# Patient Record
Sex: MALE | Race: WHITE | NOT HISPANIC OR LATINO | Employment: OTHER | URBAN - METROPOLITAN AREA
[De-identification: names, ages, dates, MRNs, and addresses within clinical notes are randomized per-mention and may not be internally consistent; named-entity substitution may affect disease eponyms.]

---

## 2023-03-15 ENCOUNTER — NURSING HOME VISIT (OUTPATIENT)
Dept: POST ACUTE CARE | Facility: EXTERNAL LOCATION | Age: 78
End: 2023-03-15
Payer: MEDICARE

## 2023-03-15 DIAGNOSIS — R41.82 ALTERED MENTAL STATUS, UNSPECIFIED ALTERED MENTAL STATUS TYPE: ICD-10-CM

## 2023-03-15 PROCEDURE — 99308 SBSQ NF CARE LOW MDM 20: CPT | Performed by: INTERNAL MEDICINE

## 2023-03-15 NOTE — LETTER
Patient: Rylan Garcia  : 1945    Encounter Date: 03/15/2023    Subjective  Chief complaint: Rylan Garcia is a 77 y.o. male who is a long term resident patient being seen and evaluated for AMS    HPI:  Nurse reports patient with altered mental status.  Does not seem to be in any distress however he is more lethargic and sleepy than usual.  No other concerns. VS WNL        Review of Systems  All systems reviewed and negative except for what was mentioned in the HPI    Vital signs: 124/72    Objective  Physical Exam  Constitutional:       General: He is not in acute distress.  Eyes:      Extraocular Movements: Extraocular movements intact.   Cardiovascular:      Rate and Rhythm: Regular rhythm.   Pulmonary:      Effort: Pulmonary effort is normal.      Breath sounds: Normal breath sounds.   Abdominal:      General: Bowel sounds are normal.      Palpations: Abdomen is soft.   Musculoskeletal:      Cervical back: Neck supple.      Right lower leg: No edema.      Left lower leg: No edema.   Neurological:      Mental Status: He is alert.   Psychiatric:         Mood and Affect: Mood normal.         Behavior: Behavior is cooperative.         Assessment/Plan  Problem List Items Addressed This Visit          Other    AMS (altered mental status)     Obtain UA C&S          Medications, treatments, and labs reviewed  Continue medications and treatments as listed in [facility]    Scribe Attestation  By signing my name below, IMagali Scribe   attest that this documentation has been prepared under the direction and in the presence of Cheko Maynard MD.    Provider Attestation - Scribe documentation  All medical record entries made by the Scribe were at my direction and personally dictated by me. I have reviewed the chart and agree that the record accurately reflects my personal performance of the history, physical exam, discussion and plan.      Electronically Signed By: Cheko Maynard MD   3/21/23  2:44 PM

## 2023-03-20 PROBLEM — R41.82 AMS (ALTERED MENTAL STATUS): Status: ACTIVE | Noted: 2023-03-20

## 2023-03-23 ENCOUNTER — NURSING HOME VISIT (OUTPATIENT)
Dept: POST ACUTE CARE | Facility: EXTERNAL LOCATION | Age: 78
End: 2023-03-23
Payer: MEDICARE

## 2023-03-23 DIAGNOSIS — I10 BENIGN HYPERTENSION: ICD-10-CM

## 2023-03-23 DIAGNOSIS — S06.5XAA SDH (SUBDURAL HEMATOMA) (MULTI): ICD-10-CM

## 2023-03-23 DIAGNOSIS — F03.90 DEMENTIA, UNSPECIFIED DEMENTIA SEVERITY, UNSPECIFIED DEMENTIA TYPE, UNSPECIFIED WHETHER BEHAVIORAL, PSYCHOTIC, OR MOOD DISTURBANCE OR ANXIETY (MULTI): ICD-10-CM

## 2023-03-23 PROCEDURE — 99305 1ST NF CARE MODERATE MDM 35: CPT | Performed by: FAMILY MEDICINE

## 2023-03-23 NOTE — LETTER
Patient: Rylan Garcia  : 1945    Encounter Date: 2023    Rylan Garcia is a 78 y.o. male with Chief Complaint of No chief complaint on file..    Resident seen 3/23/23 -- BRENDA    : 1945  SNF H&P done 3/23/23  Allergy: Aricept  DNR-CCA    S: 77 yo man with dementia, and HTN admitted to SNF rehab after hospitalization for fall with CHI, SHD s/p drain and new pAFIB in RVR.  No CP/SOB. Med List & Problem list reviewed.     O: VSS AFEB Awake, pleasantly confused, NAD.  Chest cta. Heart rrr. Ext no c/c/e.   Left hemiplegia.     A/P:  # Gait Instability/Falls/CHI/Left hemiplegia d/t SDH: fall precautions.  PT/OT.   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   # BPH w LUTS: alfuzosin 10, finasteride  # MDD: sertraline  # Hypothyroidism: 25 mcg LT4  # HTN: stable on alpha blocker.   # pAFIB: monitor HR.  No OAC or BB d/t frequent falls.       Past Surgical History:   Procedure Laterality Date   • CATARACT EXTRACTION  2018    Cataract Extraction   • HERNIA REPAIR  2018    Hernia Repair      Social History     Socioeconomic History   • Marital status:      Spouse name: Not on file   • Number of children: Not on file   • Years of education: Not on file   • Highest education level: Not on file   Occupational History   • Not on file   Tobacco Use   • Smoking status: Never   • Smokeless tobacco: Never   Vaping Use   • Vaping status: Not on file   Substance and Sexual Activity   • Alcohol use: Not on file   • Drug use: Not on file   • Sexual activity: Not on file   Other Topics Concern   • Not on file   Social History Narrative   • Not on file     Social Determinants of Health     Financial Resource Strain: Not on file   Food Insecurity: Not on file   Transportation Needs: Not on file   Physical Activity: Not on file   Stress: Not on file   Social Connections: Not on file   Intimate Partner Violence: Not on file   Housing Stability: Not on file     Past  Medical History:   Diagnosis Date   • Other specified abnormal findings of blood chemistry     Blood cholesterol increased compared with prior measurement   • Other specified postprocedural states     Status post hernia repair   • Unspecified cataract     Cataracts, bilateral      Family History   Problem Relation Name Age of Onset   • No Known Problems Mother     • No Known Problems Father          Review of Systems   Constitutional:  Negative for chills, fatigue and fever.   HENT:  Negative for rhinorrhea and sore throat.    Eyes:  Negative for pain and redness.   Respiratory:  Negative for cough and shortness of breath.    Cardiovascular:  Negative for chest pain and palpitations.   Gastrointestinal:  Negative for abdominal pain and blood in stool.   Endocrine: Negative for polydipsia and polyuria.   Genitourinary:  Negative for dysuria and hematuria.   Musculoskeletal:  Negative for back pain and neck stiffness.   Skin:  Negative for rash and wound.   Allergic/Immunologic: Negative for environmental allergies and food allergies.   Neurological:  Negative for weakness and headaches.   Hematological:  Negative for adenopathy. Does not bruise/bleed easily.   Psychiatric/Behavioral:  Negative for hallucinations and suicidal ideas.       There were no vitals taken for this visit.  Physical Exam  Vitals reviewed.   Constitutional:       General: He is not in acute distress.     Appearance: He is not ill-appearing.   HENT:      Head: Normocephalic and atraumatic.      Right Ear: Tympanic membrane normal.      Left Ear: Tympanic membrane normal.      Nose: No congestion or rhinorrhea.      Mouth/Throat:      Pharynx: No oropharyngeal exudate or posterior oropharyngeal erythema.   Eyes:      Extraocular Movements: Extraocular movements intact.      Conjunctiva/sclera: Conjunctivae normal.      Pupils: Pupils are equal, round, and reactive to light.   Cardiovascular:      Rate and Rhythm: Normal rate and regular rhythm.       Heart sounds: No murmur heard.     No friction rub. No gallop.   Pulmonary:      Effort: Pulmonary effort is normal.      Breath sounds: Normal breath sounds. No wheezing, rhonchi or rales.   Abdominal:      General: There is no distension.      Palpations: Abdomen is soft.      Tenderness: There is no abdominal tenderness. There is no guarding or rebound.   Musculoskeletal:         General: No swelling or deformity.      Cervical back: Normal range of motion and neck supple.      Right lower leg: No edema.      Left lower leg: No edema.   Skin:     Capillary Refill: Capillary refill takes less than 2 seconds.      Coloration: Skin is not jaundiced.      Findings: No rash.   Neurological:      Mental Status: He is alert.      Motor: Weakness present.      Gait: Gait abnormal.   Psychiatric:         Mood and Affect: Mood normal.       Lab Results   Component Value Date    WBC 12.0 (H) 03/18/2023    HGB 13.0 (L) 03/18/2023    HCT 38.0 (L) 03/18/2023    MCV 95 03/18/2023     03/18/2023     Lab Results   Component Value Date    CHOL 221 (H) 01/10/2019     Lab Results   Component Value Date    HDL 44.7 01/10/2019     No results found for: LDLCALC  Lab Results   Component Value Date    TRIG 91 01/10/2019     No components found for: CHOLHDL  No results found for: HGBA1C    Assessment/Plan  Problem List Items Addressed This Visit    None  Visit Diagnoses       SDH (subdural hematoma)        Benign hypertension        Dementia, unspecified dementia severity, unspecified dementia type, unspecified whether behavioral, psychotic, or mood disturbance or anxiety (CMS/[facility])                  Electronically Signed By: David Hong MD   3/25/23  2:17 PM

## 2023-03-24 ENCOUNTER — NURSING HOME VISIT (OUTPATIENT)
Dept: POST ACUTE CARE | Facility: EXTERNAL LOCATION | Age: 78
End: 2023-03-24
Payer: MEDICARE

## 2023-03-24 DIAGNOSIS — S06.5XAA SDH (SUBDURAL HEMATOMA) (MULTI): Primary | ICD-10-CM

## 2023-03-24 DIAGNOSIS — E78.5 HYPERLIPIDEMIA, UNSPECIFIED HYPERLIPIDEMIA TYPE: ICD-10-CM

## 2023-03-24 DIAGNOSIS — I48.91 ATRIAL FIBRILLATION, UNSPECIFIED TYPE (MULTI): ICD-10-CM

## 2023-03-24 DIAGNOSIS — I10 BENIGN HYPERTENSION: ICD-10-CM

## 2023-03-24 DIAGNOSIS — N40.0 BENIGN PROSTATIC HYPERPLASIA, UNSPECIFIED WHETHER LOWER URINARY TRACT SYMPTOMS PRESENT: ICD-10-CM

## 2023-03-24 DIAGNOSIS — F32.A DEPRESSION, UNSPECIFIED DEPRESSION TYPE: ICD-10-CM

## 2023-03-24 DIAGNOSIS — K59.00 CONSTIPATION, UNSPECIFIED CONSTIPATION TYPE: ICD-10-CM

## 2023-03-24 DIAGNOSIS — E56.9 VITAMIN DEFICIENCY: ICD-10-CM

## 2023-03-24 DIAGNOSIS — E03.9 HYPOTHYROIDISM, UNSPECIFIED TYPE: ICD-10-CM

## 2023-03-24 DIAGNOSIS — M15.9 GENERALIZED OSTEOARTHRITIS OF MULTIPLE SITES: ICD-10-CM

## 2023-03-24 PROCEDURE — 99310 SBSQ NF CARE HIGH MDM 45: CPT | Performed by: NURSE PRACTITIONER

## 2023-03-24 NOTE — LETTER
Patient: Rylan Garcia  : 1945    Encounter Date: 2023    *Provider Impression*    Patient is a 77 year old male who is seen today for management of multiple medical problems  #SDH / OA - s/p R craniotomy; PT/OT/ST; helmet, acetaminophen 650mg q6h PRN, gluosamine 1000mg QOD, oxycodone 5mg q6h PRN, f/u w/ Dr. Ramos,   #HTN / HLD / A-fib - CoQ10 100mg daily, Zio patch, f/u w/ EP on , omega 3 650mg QOD  #BPH - alfluzosin ER 10mg daily, finasteride 5mg daily  #Hypothyroidism - levothyroxine 25mcg daily  #Constipation - colace 100mg BID, senna 17.2mg daily  #Depression - sertraline 50mg daily  #Vitamin deficiency - vitamin B12 250mcg daily, multivitamin daily  Follow up as needed      *Chief Complaint*     SDH    *History of Present Illness*    Patient is a 76 y/o male w/ PMH as below who presented as a transfer 3/16/23 with subacute development of left sided weakness, confusion, and incontinence.  CTH showed a new subdural hematomas (R>L).  Patient was taken to the OR 3/17/23 for a R crani for SDH evacuation. CTH improved MLS.  Surgical drain removed POD3.  Electrophysiology consulted for new onset A-fib and Ziopatch placed at discharge w/ plan to follow up with EP 23.  PT/OT evaluated and recommended SNF.  Speech evaluated and patient was cleared for a dysphagia advanced diet with nectar thickened liquids.  Patient was discharged to [facility] in satisfactory condition   with scheduled follow up.      He is seen sitting up by the nurse's station and denies any pain, no HA, dizziness, vision changes, weakness, numbness, tingling, paresthesias, CP, SOB, cough, n/v, constipation, diarrhea, or any other c/o presently.     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, dementia, hypothyroidism, frequent falls, SDH  PSH - hernia repair, cataract extraction  FH - Father had heart disease, dementia, mother had HTN  SocHx -  former smoker, No  EtOH    *Review of Systems*  All other systems reviewed are negative except as noted in the HPI     *Vital Signs*   Date: 3/24/23  - T: 98.1  P: 70  R: 16  BP: 130/76  SpO2: 94% on RA    *Results / Data*  CBC - Date: 3/24/23  WBC: 8.69  HGB: 14.7  HCT: 41.7  PLT: 361  ;   BMP - Date: 3/24/23  Na: 136  K: 3.9  Cl: 101  CO2: 23  BUN: 17  Cr: 0.95  Glu: 137  Ca: 9.4  ;   LFT - Date: 3/24/23  AST: 58  ALT: 81  ALP: 124  TBili: 0.3  ;   Coags - Date:   INR:   PT:    *Physical Exam*  Gen: (+) NAD, (+) well-appearing  HEENT: (+) incision w/ staples c/d/i, (+) MMM  Neck: (+) supple  Lungs: (+) CTAB, (-) wheezes, (-) rales, (-) rhonchi  Heart: (+) RRR, (+) S1 S2, (-) murmurs  Pulses: (+) palpable  Abd: (+) soft, (+) NT, (+) ND, (+) BS+  Ext: (-) edema, (-) deformity  MSK: (-) joint swelling  Skin: (+) warm, (+) dry, (-) rash  Neuro: (+) follows commands, (-) tremor, (+) alert      Electronically Signed By: GIN Mcnulty-CNP   3/28/23 12:22 AM

## 2023-03-25 ENCOUNTER — NURSING HOME VISIT (OUTPATIENT)
Dept: POST ACUTE CARE | Facility: EXTERNAL LOCATION | Age: 78
End: 2023-03-25
Payer: MEDICARE

## 2023-03-25 DIAGNOSIS — I10 PRIMARY HYPERTENSION: ICD-10-CM

## 2023-03-25 DIAGNOSIS — F02.C3 OTHER SEVERE FRONTOTEMPORAL DEMENTIA WITH MOOD DISTURBANCE (MULTI): ICD-10-CM

## 2023-03-25 DIAGNOSIS — G31.09 OTHER SEVERE FRONTOTEMPORAL DEMENTIA WITH MOOD DISTURBANCE (MULTI): ICD-10-CM

## 2023-03-25 DIAGNOSIS — S06.5XAA SDH (SUBDURAL HEMATOMA) (MULTI): Primary | ICD-10-CM

## 2023-03-25 PROBLEM — G83.10: Status: ACTIVE | Noted: 2023-03-25

## 2023-03-25 PROBLEM — F03.90 DEMENTIA (MULTI): Status: ACTIVE | Noted: 2023-03-25

## 2023-03-25 PROCEDURE — 99308 SBSQ NF CARE LOW MDM 20: CPT | Performed by: FAMILY MEDICINE

## 2023-03-25 ASSESSMENT — ENCOUNTER SYMPTOMS
HEADACHES: 0
NECK STIFFNESS: 0
SORE THROAT: 0
WOUND: 0
PALPITATIONS: 0
EYE REDNESS: 0
HALLUCINATIONS: 0
SHORTNESS OF BREATH: 0
COUGH: 0
FEVER: 0
ABDOMINAL PAIN: 0
HEMATURIA: 0
DYSURIA: 0
RHINORRHEA: 0
FATIGUE: 0
BRUISES/BLEEDS EASILY: 0
BLOOD IN STOOL: 0
BACK PAIN: 0
EYE PAIN: 0
WEAKNESS: 0
ADENOPATHY: 0
POLYDIPSIA: 0
CHILLS: 0

## 2023-03-25 NOTE — LETTER
Patient: Rylan Garcia  : 1945    Encounter Date: 2023    Resident seen 3/25/23 -- MP    : 1945  SNF H&P done 3/23/23  Allergy: Aricept  DNR-CCA    S: 77 yo man with dementia, hypothyroidism, HTN and recent fall with CHI, SHD s/p drain and new pAFIB in RVR.  No CP/SOB. Med List & Problem list reviewed.     O: VSS AFEB Awake, pleasantly confused, NAD.  Chest cta. Heart rrr. Ext no c/c/e.   Left hemiplegia.     A/P:  # Gait Instability/Falls/CHI/Left hemiplegia d/t SDH: fall precautions.  PT/OT.   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   # BPH w LUTS: alfuzosin 10, finasteride  # MDD: sertraline  # Hypothyroidism: 25 mcg LT4  # HTN: stable on alpha blocker.   # pAFIB: monitor HR.  No OAC or BB d/t frequent falls.       Electronically Signed By: David Hong MD   3/25/23  2:19 PM

## 2023-03-25 NOTE — PROGRESS NOTES
Rylan Garcia is a 78 y.o. male with Chief Complaint of No chief complaint on file..    Resident seen 3/23/23 -- MP    : 1945  SNF H&P done 3/23/23  Allergy: Aricept  DNR-CCA    S: 77 yo man with dementia, and HTN admitted to SNF rehab after hospitalization for fall with CHI, SHD s/p drain and new pAFIB in RVR.  No CP/SOB. Med List & Problem list reviewed.     O: VSS AFEB Awake, pleasantly confused, NAD.  Chest cta. Heart rrr. Ext no c/c/e.   Left hemiplegia.     A/P:  # Gait Instability/Falls/CHI/Left hemiplegia d/t SDH: fall precautions.  PT/OT.   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   # BPH w LUTS: alfuzosin 10, finasteride  # MDD: sertraline  # Hypothyroidism: 25 mcg LT4  # HTN: stable on alpha blocker.   # pAFIB: monitor HR.  No OAC or BB d/t frequent falls.       Past Surgical History:   Procedure Laterality Date    CATARACT EXTRACTION  2018    Cataract Extraction    HERNIA REPAIR  2018    Hernia Repair      Social History     Socioeconomic History    Marital status:      Spouse name: Not on file    Number of children: Not on file    Years of education: Not on file    Highest education level: Not on file   Occupational History    Not on file   Tobacco Use    Smoking status: Never    Smokeless tobacco: Never   Vaping Use    Vaping status: Not on file   Substance and Sexual Activity    Alcohol use: Not on file    Drug use: Not on file    Sexual activity: Not on file   Other Topics Concern    Not on file   Social History Narrative    Not on file     Social Determinants of Health     Financial Resource Strain: Not on file   Food Insecurity: Not on file   Transportation Needs: Not on file   Physical Activity: Not on file   Stress: Not on file   Social Connections: Not on file   Intimate Partner Violence: Not on file   Housing Stability: Not on file     Past Medical History:   Diagnosis Date    Other specified abnormal findings of blood chemistry      Blood cholesterol increased compared with prior measurement    Other specified postprocedural states     Status post hernia repair    Unspecified cataract     Cataracts, bilateral      Family History   Problem Relation Name Age of Onset    No Known Problems Mother      No Known Problems Father          Review of Systems   Constitutional:  Negative for chills, fatigue and fever.   HENT:  Negative for rhinorrhea and sore throat.    Eyes:  Negative for pain and redness.   Respiratory:  Negative for cough and shortness of breath.    Cardiovascular:  Negative for chest pain and palpitations.   Gastrointestinal:  Negative for abdominal pain and blood in stool.   Endocrine: Negative for polydipsia and polyuria.   Genitourinary:  Negative for dysuria and hematuria.   Musculoskeletal:  Negative for back pain and neck stiffness.   Skin:  Negative for rash and wound.   Allergic/Immunologic: Negative for environmental allergies and food allergies.   Neurological:  Negative for weakness and headaches.   Hematological:  Negative for adenopathy. Does not bruise/bleed easily.   Psychiatric/Behavioral:  Negative for hallucinations and suicidal ideas.       There were no vitals taken for this visit.  Physical Exam  Vitals reviewed.   Constitutional:       General: He is not in acute distress.     Appearance: He is not ill-appearing.   HENT:      Head: Normocephalic and atraumatic.      Right Ear: Tympanic membrane normal.      Left Ear: Tympanic membrane normal.      Nose: No congestion or rhinorrhea.      Mouth/Throat:      Pharynx: No oropharyngeal exudate or posterior oropharyngeal erythema.   Eyes:      Extraocular Movements: Extraocular movements intact.      Conjunctiva/sclera: Conjunctivae normal.      Pupils: Pupils are equal, round, and reactive to light.   Cardiovascular:      Rate and Rhythm: Normal rate and regular rhythm.      Heart sounds: No murmur heard.     No friction rub. No gallop.   Pulmonary:      Effort:  Pulmonary effort is normal.      Breath sounds: Normal breath sounds. No wheezing, rhonchi or rales.   Abdominal:      General: There is no distension.      Palpations: Abdomen is soft.      Tenderness: There is no abdominal tenderness. There is no guarding or rebound.   Musculoskeletal:         General: No swelling or deformity.      Cervical back: Normal range of motion and neck supple.      Right lower leg: No edema.      Left lower leg: No edema.   Skin:     Capillary Refill: Capillary refill takes less than 2 seconds.      Coloration: Skin is not jaundiced.      Findings: No rash.   Neurological:      Mental Status: He is alert.      Motor: Weakness present.      Gait: Gait abnormal.   Psychiatric:         Mood and Affect: Mood normal.       Lab Results   Component Value Date    WBC 12.0 (H) 03/18/2023    HGB 13.0 (L) 03/18/2023    HCT 38.0 (L) 03/18/2023    MCV 95 03/18/2023     03/18/2023     Lab Results   Component Value Date    CHOL 221 (H) 01/10/2019     Lab Results   Component Value Date    HDL 44.7 01/10/2019     No results found for: LDLCALC  Lab Results   Component Value Date    TRIG 91 01/10/2019     No components found for: CHOLHDL  No results found for: HGBA1C    Assessment/Plan   Problem List Items Addressed This Visit    None  Visit Diagnoses       SDH (subdural hematoma)        Benign hypertension        Dementia, unspecified dementia severity, unspecified dementia type, unspecified whether behavioral, psychotic, or mood disturbance or anxiety (CMS/[facility])

## 2023-03-25 NOTE — PROGRESS NOTES
Resident seen 3/25/23 -- MP    : 1945  SNF H&P done 3/23/23  Allergy: Aricept  DNR-CCA    S: 77 yo man with dementia, hypothyroidism, HTN and recent fall with CHI, SHD s/p drain and new pAFIB in RVR.  No CP/SOB. Med List & Problem list reviewed.     O: VSS AFEB Awake, pleasantly confused, NAD.  Chest cta. Heart rrr. Ext no c/c/e.   Left hemiplegia.     A/P:  # Gait Instability/Falls/CHI/Left hemiplegia d/t SDH: fall precautions.  PT/OT.   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   # BPH w LUTS: alfuzosin 10, finasteride  # MDD: sertraline  # Hypothyroidism: 25 mcg LT4  # HTN: stable on alpha blocker.   # pAFIB: monitor HR.  No OAC or BB d/t frequent falls.

## 2023-03-28 NOTE — PROGRESS NOTES
*Provider Impression*    Patient is a 77 year old male who is seen today for management of multiple medical problems  #SDH / OA - s/p R craniotomy; PT/OT/ST; helmet, acetaminophen 650mg q6h PRN, gluosamine 1000mg QOD, oxycodone 5mg q6h PRN, f/u w/ Dr. Ramos,   #HTN / HLD / A-fib - CoQ10 100mg daily, Zio patch, f/u w/ EP on 4/26, omega 3 650mg QOD  #BPH - alfluzosin ER 10mg daily, finasteride 5mg daily  #Hypothyroidism - levothyroxine 25mcg daily  #Constipation - colace 100mg BID, senna 17.2mg daily  #Depression - sertraline 50mg daily  #Vitamin deficiency - vitamin B12 250mcg daily, multivitamin daily  Follow up as needed      *Chief Complaint*     SDH    *History of Present Illness*    Patient is a 76 y/o male w/ PMH as below who presented as a transfer 3/16/23 with subacute development of left sided weakness, confusion, and incontinence.  CTH showed a new subdural hematomas (R>L).  Patient was taken to the OR 3/17/23 for a R crani for SDH evacuation. CTH improved MLS.  Surgical drain removed POD3.  Electrophysiology consulted for new onset A-fib and Ziopatch placed at discharge w/ plan to follow up with EP 4/26/23.  PT/OT evaluated and recommended SNF.  Speech evaluated and patient was cleared for a dysphagia advanced diet with nectar thickened liquids.  Patient was discharged to [facility] in satisfactory condition   with scheduled follow up.      He is seen sitting up by the nurse's station and denies any pain, no HA, dizziness, vision changes, weakness, numbness, tingling, paresthesias, CP, SOB, cough, n/v, constipation, diarrhea, or any other c/o presently.     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, dementia, hypothyroidism, frequent falls, SDH  PSH - hernia repair, cataract extraction  FH - Father had heart disease, dementia, mother had HTN  SocHx -  former smoker, No EtOH    *Review of Systems*  All other systems reviewed are negative except as  noted in the HPI     *Vital Signs*   Date: 3/24/23  - T: 98.1  P: 70  R: 16  BP: 130/76  SpO2: 94% on RA    *Results / Data*  CBC - Date: 3/24/23  WBC: 8.69  HGB: 14.7  HCT: 41.7  PLT: 361  ;   BMP - Date: 3/24/23  Na: 136  K: 3.9  Cl: 101  CO2: 23  BUN: 17  Cr: 0.95  Glu: 137  Ca: 9.4  ;   LFT - Date: 3/24/23  AST: 58  ALT: 81  ALP: 124  TBili: 0.3  ;   Coags - Date:   INR:   PT:    *Physical Exam*  Gen: (+) NAD, (+) well-appearing  HEENT: (+) incision w/ staples c/d/i, (+) MMM  Neck: (+) supple  Lungs: (+) CTAB, (-) wheezes, (-) rales, (-) rhonchi  Heart: (+) RRR, (+) S1 S2, (-) murmurs  Pulses: (+) palpable  Abd: (+) soft, (+) NT, (+) ND, (+) BS+  Ext: (-) edema, (-) deformity  MSK: (-) joint swelling  Skin: (+) warm, (+) dry, (-) rash  Neuro: (+) follows commands, (-) tremor, (+) alert

## 2023-03-29 ENCOUNTER — NURSING HOME VISIT (OUTPATIENT)
Dept: POST ACUTE CARE | Facility: EXTERNAL LOCATION | Age: 78
End: 2023-03-29
Payer: MEDICARE

## 2023-03-29 DIAGNOSIS — N40.0 BENIGN PROSTATIC HYPERPLASIA, UNSPECIFIED WHETHER LOWER URINARY TRACT SYMPTOMS PRESENT: ICD-10-CM

## 2023-03-29 DIAGNOSIS — F32.A DEPRESSION, UNSPECIFIED DEPRESSION TYPE: ICD-10-CM

## 2023-03-29 DIAGNOSIS — I10 BENIGN HYPERTENSION: ICD-10-CM

## 2023-03-29 DIAGNOSIS — E78.5 HYPERLIPIDEMIA, UNSPECIFIED HYPERLIPIDEMIA TYPE: ICD-10-CM

## 2023-03-29 DIAGNOSIS — I48.91 ATRIAL FIBRILLATION, UNSPECIFIED TYPE (MULTI): ICD-10-CM

## 2023-03-29 DIAGNOSIS — E56.9 VITAMIN DEFICIENCY: ICD-10-CM

## 2023-03-29 DIAGNOSIS — K59.00 CONSTIPATION, UNSPECIFIED CONSTIPATION TYPE: ICD-10-CM

## 2023-03-29 DIAGNOSIS — E03.9 HYPOTHYROIDISM, UNSPECIFIED TYPE: ICD-10-CM

## 2023-03-29 DIAGNOSIS — M15.9 GENERALIZED OSTEOARTHRITIS OF MULTIPLE SITES: ICD-10-CM

## 2023-03-29 DIAGNOSIS — S06.5XAA SDH (SUBDURAL HEMATOMA) (MULTI): Primary | ICD-10-CM

## 2023-03-29 PROCEDURE — 99309 SBSQ NF CARE MODERATE MDM 30: CPT | Performed by: NURSE PRACTITIONER

## 2023-03-29 NOTE — LETTER
Patient: Rylan Garcia  : 1945    Encounter Date: 2023    *Provider Impression*    Patient is a 77 year old male who is seen today for management of multiple medical problems  #SDH / OA - s/p R craniotomy; PT/OT/ST; helmet, acetaminophen 650mg q6h PRN, gluosamine 1000mg QOD, oxycodone 5mg q6h PRN, f/u w/ Dr. Ramos,   #HTN / HLD / A-fib - CoQ10 100mg daily, f/u w/ EP on , omega 3 650mg QOD  #BPH - alfluzosin ER 10mg daily, finasteride 5mg daily  #Hypothyroidism - levothyroxine 25mcg daily  #Constipation - colace 100mg BID, senna 17.2mg daily  #Depression - sertraline 50mg daily  #Vitamin deficiency - vitamin B12 250mcg daily, multivitamin daily  Follow up as needed      *Chief Complaint*     SDH    *History of Present Illness*    Patient is a 78 y/o male w/ PMH as below who presented as a transfer 3/16/23 with subacute development of left sided weakness, confusion, and incontinence.  CTH showed a new subdural hematomas (R>L).  Patient was taken to the OR 3/17/23 for a R crani for SDH evacuation. CTH improved MLS.  Surgical drain removed POD3.  Electrophysiology consulted for new onset A-fib and Ziopatch placed at discharge w/ plan to follow up with EP 23.  PT/OT evaluated and recommended SNF.  Speech evaluated and patient was cleared for a dysphagia advanced diet with nectar thickened liquids.  Patient was discharged to [facility] in satisfactory condition   with scheduled follow up.      He is seen sitting up in his room w/ daugher and ILDA. He reports he is feeling like strength is coming back, no HA, vision changes, no new weakness, numbness, tingling paresthesias, no dysphagia, no cough, CP, SOB, no new incontinence, no LUTS, constipation, diarreha, or any other c/o presently.     Nursing staff report he pulled off his Zio patch and cardiology was informed. He is likely not a candidate for anticoagulation.     Alleriges - Aricept  PMH - HTN, HLD, corticobasal degeneration w/ primarily  right hemibody symptoms, dementia, hypothyroidism, frequent falls, SDH  PSH - hernia repair, cataract extraction  FH - Father had heart disease, dementia, mother had HTN  SocHx -  former smoker, No EtOH    *Review of Systems*  All other systems reviewed are negative except as noted in the HPI     *Vital Siins*   Date: 3/28/23  - T: 97.3  P: 72  R: 16  BP: 119/93  SpO2: 94% on RA    *Results / Data*  CBC - Date: 3/29/23  WBC: 7.08  HGB: 13.3  HCT: 38.9  PLT: 354  ;   BMP - Date: 3/29/23  Na: 137  K: 4.2  Cl: 103  CO2: 23  BUN: 14  Cr: 0.82  Glu: 93  Ca: 8.9  ;   LFT - Date: 3/29/23  AST: 33  ALT: 60  ALP: 106  TBili: 0.3  ;   Coags - Date:   INR:   PT:  Other - Date: 3/29/23 - TSH: 1.100  T4: 5.8    *Physical Exam*  Gen: (+) NAD, (+) well-appearing  HEENT: (+) incision w/ staples c/d/i, (+) MMM, (+) EOMI  Neck: (+) supple  Lungs: (+) CTAB, (-) wheezes, (-) rales, (-) rhonchi  Heart: (+) RRR, (+) S1 S2, (-) murmurs  Pulses: (+) palpable  Abd: (+) soft, (+) NT, (+) ND, (+) BS+  Ext: (-) edema, (-) deformity  MSK: (-) joint swelling  Skin: (+) warm, (+) dry, (-) rash  Neuro: (+) follows commands, (-) tremor, (+) alert, (-) focal weakness      Electronically Signed By: GIN Mcnulty-CNP   4/3/23  3:15 PM

## 2023-04-03 ENCOUNTER — NURSING HOME VISIT (OUTPATIENT)
Dept: POST ACUTE CARE | Facility: EXTERNAL LOCATION | Age: 78
End: 2023-04-03
Payer: MEDICARE

## 2023-04-03 DIAGNOSIS — S06.5XAA SDH (SUBDURAL HEMATOMA) (MULTI): Primary | ICD-10-CM

## 2023-04-03 DIAGNOSIS — F32.A DEPRESSION, UNSPECIFIED DEPRESSION TYPE: ICD-10-CM

## 2023-04-03 PROCEDURE — 99308 SBSQ NF CARE LOW MDM 20: CPT | Performed by: FAMILY MEDICINE

## 2023-04-03 NOTE — LETTER
Patient: Rylan Garcia  : 1945    Encounter Date: 2023    Resident seen 4/3/23 -- MP    : 1945  SNF H&P done 3/23/23  Allergy: Aricept  DNR-CCA    S: 79 yo man with dementia, hypothyroidism, HTN and recent fall with CHI, SHD s/p drain and new pAFIB in RVR. No CP/SOB. Med List & Problem list reviewed.    O: VSS AFEB Awake, pleasantly confused, NAD. Chest cta. Heart rrr. Ext no c/c/e. Left hemiplegia.    A/P:  # Gait Instability/Falls/CHI/Left hemiplegia d/t SDH: fall precautions. PT/OT.  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  # BPH w LUTS: alfuzosin 10, finasteride  # MDD: sertraline  # Hypothyroidism: 25 mcg LT4  # HTN: stable on alpha blocker.  # pAFIB: monitor HR. No OAC or BB d/t frequent falls.      Electronically Signed By: David Hong MD   23  2:44 PM

## 2023-04-03 NOTE — PROGRESS NOTES
*Provider Impression*    Patient is a 77 year old male who is seen today for management of multiple medical problems  #SDH / OA - s/p R craniotomy; PT/OT/ST; helmet, acetaminophen 650mg q6h PRN, gluosamine 1000mg QOD, oxycodone 5mg q6h PRN, f/u w/ Dr. Ramos,   #HTN / HLD / A-fib - CoQ10 100mg daily, f/u w/ EP on 4/26, omega 3 650mg QOD  #BPH - alfluzosin ER 10mg daily, finasteride 5mg daily  #Hypothyroidism - levothyroxine 25mcg daily  #Constipation - colace 100mg BID, senna 17.2mg daily  #Depression - sertraline 50mg daily  #Vitamin deficiency - vitamin B12 250mcg daily, multivitamin daily  Follow up as needed      *Chief Complaint*     SDH    *History of Present Illness*    Patient is a 78 y/o male w/ PMH as below who presented as a transfer 3/16/23 with subacute development of left sided weakness, confusion, and incontinence.  CTH showed a new subdural hematomas (R>L).  Patient was taken to the OR 3/17/23 for a R crani for SDH evacuation. CTH improved MLS.  Surgical drain removed POD3.  Electrophysiology consulted for new onset A-fib and Ziopatch placed at discharge w/ plan to follow up with EP 4/26/23.  PT/OT evaluated and recommended SNF.  Speech evaluated and patient was cleared for a dysphagia advanced diet with nectar thickened liquids.  Patient was discharged to [facility] in satisfactory condition   with scheduled follow up.      He is seen sitting up in his room w/ daugher and ILDA. He reports he is feeling like strength is coming back, no HA, vision changes, no new weakness, numbness, tingling paresthesias, no dysphagia, no cough, CP, SOB, no new incontinence, no LUTS, constipation, diarreha, or any other c/o presently.     Nursing staff report he pulled off his Zio patch and cardiology was informed. He is likely not a candidate for anticoagulation.     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, dementia, hypothyroidism, frequent falls, SDH  PSH  - hernia repair, cataract extraction  FH - Father had heart disease, dementia, mother had HTN  SocHx -  former smoker, No EtOH    *Review of Systems*  All other systems reviewed are negative except as noted in the HPI     *Vital Siins*   Date: 3/28/23  - T: 97.3  P: 72  R: 16  BP: 119/93  SpO2: 94% on RA    *Results / Data*  CBC - Date: 3/29/23  WBC: 7.08  HGB: 13.3  HCT: 38.9  PLT: 354  ;   BMP - Date: 3/29/23  Na: 137  K: 4.2  Cl: 103  CO2: 23  BUN: 14  Cr: 0.82  Glu: 93  Ca: 8.9  ;   LFT - Date: 3/29/23  AST: 33  ALT: 60  ALP: 106  TBili: 0.3  ;   Coags - Date:   INR:   PT:  Other - Date: 3/29/23 - TSH: 1.100  T4: 5.8    *Physical Exam*  Gen: (+) NAD, (+) well-appearing  HEENT: (+) incision w/ staples c/d/i, (+) MMM, (+) EOMI  Neck: (+) supple  Lungs: (+) CTAB, (-) wheezes, (-) rales, (-) rhonchi  Heart: (+) RRR, (+) S1 S2, (-) murmurs  Pulses: (+) palpable  Abd: (+) soft, (+) NT, (+) ND, (+) BS+  Ext: (-) edema, (-) deformity  MSK: (-) joint swelling  Skin: (+) warm, (+) dry, (-) rash  Neuro: (+) follows commands, (-) tremor, (+) alert, (-) focal weakness

## 2023-04-07 ENCOUNTER — NURSING HOME VISIT (OUTPATIENT)
Dept: POST ACUTE CARE | Facility: EXTERNAL LOCATION | Age: 78
End: 2023-04-07
Payer: MEDICARE

## 2023-04-07 DIAGNOSIS — M15.9 GENERALIZED OSTEOARTHRITIS OF MULTIPLE SITES: ICD-10-CM

## 2023-04-07 DIAGNOSIS — I10 BENIGN HYPERTENSION: ICD-10-CM

## 2023-04-07 DIAGNOSIS — E78.5 HYPERLIPIDEMIA, UNSPECIFIED HYPERLIPIDEMIA TYPE: ICD-10-CM

## 2023-04-07 DIAGNOSIS — N40.0 BENIGN PROSTATIC HYPERPLASIA, UNSPECIFIED WHETHER LOWER URINARY TRACT SYMPTOMS PRESENT: ICD-10-CM

## 2023-04-07 DIAGNOSIS — I48.91 ATRIAL FIBRILLATION, UNSPECIFIED TYPE (MULTI): ICD-10-CM

## 2023-04-07 DIAGNOSIS — E03.9 HYPOTHYROIDISM, UNSPECIFIED TYPE: ICD-10-CM

## 2023-04-07 DIAGNOSIS — F32.A DEPRESSION, UNSPECIFIED DEPRESSION TYPE: ICD-10-CM

## 2023-04-07 DIAGNOSIS — K59.00 CONSTIPATION, UNSPECIFIED CONSTIPATION TYPE: ICD-10-CM

## 2023-04-07 DIAGNOSIS — G47.00 INSOMNIA, UNSPECIFIED TYPE: ICD-10-CM

## 2023-04-07 DIAGNOSIS — S06.5XAA SDH (SUBDURAL HEMATOMA) (MULTI): Primary | ICD-10-CM

## 2023-04-07 PROCEDURE — 99308 SBSQ NF CARE LOW MDM 20: CPT | Performed by: NURSE PRACTITIONER

## 2023-04-07 NOTE — LETTER
Patient: Rylan Garcia  : 1945    Encounter Date: 2023    *Provider Impression*    Patient is a 77 year old male who is seen today for management of multiple medical problems  #SDH / OA - s/p R craniotomy; PT/OT/ST; helmet, acetaminophen 650mg q6h PRN, gluosamine 1000mg QOD, oxycodone 5mg q6h PRN, f/u w/ Dr. Ramos,   #HTN / HLD / A-fib - CoQ10 100mg daily, f/u w/ EP on , omega 3 650mg QOD  #BPH - alfluzosin ER 10mg daily, finasteride 5mg daily  #Hypothyroidism - levothyroxine 25mcg daily  #Constipation - colace 100mg BID, senna 17.2mg daily  #Depression / Insomnia - sertraline 50mg daily, hydroxyzine 25mg q6h PRN, melatonin 5mg QHS  #Vitamin deficiency - vitamin B12 2500mcg daily, multivitamin daily  Follow up as needed      *Chief Complaint*     SDH    *History of Present Illness*    Patient is a 76 y/o male w/ PMH as below who presented as a transfer 3/16/23 with subacute development of left sided weakness, confusion, and incontinence.  CTH showed a new subdural hematomas (R>L).  Patient was taken to the OR 3/17/23 for a R crani for SDH evacuation. CTH improved MLS.  Surgical drain removed POD3.  Electrophysiology consulted for new onset A-fib and Ziopatch placed at discharge w/ plan to follow up with EP 23.  PT/OT evaluated and recommended SNF.  Speech evaluated and patient was cleared for a dysphagia advanced diet with nectar thickened liquids.  Patient was discharged to [facility] in satisfactory condition   with scheduled follow up.      He is seen sitting up in his room and denies any pain, HA, dizziness, CP, SOB, cough, n/v, constipation, diarrhea, or any other c/o presently.     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, dementia, hypothyroidism, frequent falls, SDH  PSH - hernia repair, cataract extraction  FH - Father had heart disease, dementia, mother had HTN  SocHx -  former smoker, No EtOH    *Review of Systems*  All  other systems reviewed are negative except as noted in the HPI     *Vital Siins*   Date: 4/5/23  - T: 96.8  P: 69  R: 18  BP: 120/76  SpO2: 97% on RA    *Results / Data*  CBC - Date: 3/29/23  WBC: 7.08  HGB: 13.3  HCT: 38.9  PLT: 354  ;   BMP - Date: 3/29/23  Na: 137  K: 4.2  Cl: 103  CO2: 23  BUN: 14  Cr: 0.82  Glu: 93  Ca: 8.9  ;   LFT - Date: 3/29/23  AST: 33  ALT: 60  ALP: 106  Tbili: 0.3  ;   Coags - Date:   INR:   PT:  Other - Date: 3/29/23 - TSH: 1.100  T4: 5.8    *Physical Exam*  Gen: (+) NAD, (+) well-appearing  HEENT: (+) incision w/ staples c/d/I, (+) MMM, (+) EOMI  Neck: (+) supple  Lungs: (+) CTAB, (-) wheezes, (-) rales, (-) rhonchi  Heart: (+) RRR, (+) S1 S2, (-) murmurs  Pulses: (+) palpable  Abd: (+) soft, (+) NT, (+) ND, (+) BS+  Ext: (-) edema, (-) deformity  MSK: (-) joint swelling  Skin: (+) warm, (+) dry, (-) rash  Neuro: (+) follows commands, (-) tremor, (+) alert, (-) focal weakness      Electronically Signed By: JESSICA Mcnulty   4/11/23 10:57 AM

## 2023-04-08 NOTE — PROGRESS NOTES
Resident seen 4/3/23 -- MP    : 1945  SNF H&P done 3/23/23  Allergy: Aricept  DNR-CCA    S: 79 yo man with dementia, hypothyroidism, HTN and recent fall with CHI, SHD s/p drain and new pAFIB in RVR. No CP/SOB. Med List & Problem list reviewed.    O: VSS AFEB Awake, pleasantly confused, NAD. Chest cta. Heart rrr. Ext no c/c/e. Left hemiplegia.    A/P:  # Gait Instability/Falls/CHI/Left hemiplegia d/t SDH: fall precautions. PT/OT.  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  # BPH w LUTS: alfuzosin 10, finasteride  # MDD: sertraline  # Hypothyroidism: 25 mcg LT4  # HTN: stable on alpha blocker.  # pAFIB: monitor HR. No OAC or BB d/t frequent falls.

## 2023-04-10 ENCOUNTER — NURSING HOME VISIT (OUTPATIENT)
Dept: POST ACUTE CARE | Facility: EXTERNAL LOCATION | Age: 78
End: 2023-04-10
Payer: MEDICARE

## 2023-04-10 DIAGNOSIS — I48.0 PAROXYSMAL ATRIAL FIBRILLATION (MULTI): ICD-10-CM

## 2023-04-10 DIAGNOSIS — N40.1 BENIGN PROSTATIC HYPERPLASIA WITH LOWER URINARY TRACT SYMPTOMS, SYMPTOM DETAILS UNSPECIFIED: ICD-10-CM

## 2023-04-10 DIAGNOSIS — S06.5XAA SDH (SUBDURAL HEMATOMA) (MULTI): ICD-10-CM

## 2023-04-10 PROCEDURE — 99308 SBSQ NF CARE LOW MDM 20: CPT | Performed by: FAMILY MEDICINE

## 2023-04-10 NOTE — LETTER
Patient: Rylan Garcia  : 1945    Encounter Date: 04/10/2023    Resident seen 4/10/23 -- MP    : 1945  SNF H&P done 3/23/23  Allergy: Aricept  DNR-CCA    S: 79 yo man with dementia, hypothyroidism, HTN and recent fall with CHI, SHD s/p drain and new pAFIB in RVR. Craniotomy staples removed per neurosurgery. No CP/SOB. Med List & Problem list reviewed.    O: VSS AFEB 153# (23) Awake, pleasantly confused, NAD. Chest cta. Heart rrr. Ext no c/c/e. Left hemiplegia.    A/P:  # Gait Instability/Falls/CHI/Left hemiplegia d/t SDH: fall precautions. PT/OT.  # Dementia/corticobasal degeneration (G31.85): ST  # OA: oxycodone prn.  # BPH w LUTS: alfuzosin 10, finasteride  # MDD: sertraline  # Hypothyroidism: 25 mcg LT4  # HTN: stable on alpha blocker.  # pAFIB: monitor HR. No OAC or BB d/t frequent falls.      Electronically Signed By: David Hong MD   23  6:18 PM

## 2023-04-11 NOTE — PROGRESS NOTES
*Provider Impression*    Patient is a 77 year old male who is seen today for management of multiple medical problems  #SDH / OA - s/p R craniotomy; PT/OT/ST; helmet, acetaminophen 650mg q6h PRN, gluosamine 1000mg QOD, oxycodone 5mg q6h PRN, f/u w/ Dr. Ramos,   #HTN / HLD / A-fib - CoQ10 100mg daily, f/u w/ EP on 4/26, omega 3 650mg QOD  #BPH - alfluzosin ER 10mg daily, finasteride 5mg daily  #Hypothyroidism - levothyroxine 25mcg daily  #Constipation - colace 100mg BID, senna 17.2mg daily  #Depression / Insomnia - sertraline 50mg daily, hydroxyzine 25mg q6h PRN, melatonin 5mg QHS  #Vitamin deficiency - vitamin B12 2500mcg daily, multivitamin daily  Follow up as needed      *Chief Complaint*     SDH    *History of Present Illness*    Patient is a 76 y/o male w/ PMH as below who presented as a transfer 3/16/23 with subacute development of left sided weakness, confusion, and incontinence.  CTH showed a new subdural hematomas (R>L).  Patient was taken to the OR 3/17/23 for a R crani for SDH evacuation. CTH improved MLS.  Surgical drain removed POD3.  Electrophysiology consulted for new onset A-fib and Ziopatch placed at discharge w/ plan to follow up with EP 4/26/23.  PT/OT evaluated and recommended SNF.  Speech evaluated and patient was cleared for a dysphagia advanced diet with nectar thickened liquids.  Patient was discharged to [facility] in satisfactory condition   with scheduled follow up.      He is seen sitting up in his room and denies any pain, HA, dizziness, CP, SOB, cough, n/v, constipation, diarrhea, or any other c/o presently.     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, dementia, hypothyroidism, frequent falls, SDH  PSH - hernia repair, cataract extraction  FH - Father had heart disease, dementia, mother had HTN  SocHx -  former smoker, No EtOH    *Review of Systems*  All other systems reviewed are negative except as noted in the HPI     *Vital  Siins*   Date: 4/5/23  - T: 96.8  P: 69  R: 18  BP: 120/76  SpO2: 97% on RA    *Results / Data*  CBC - Date: 3/29/23  WBC: 7.08  HGB: 13.3  HCT: 38.9  PLT: 354  ;   BMP - Date: 3/29/23  Na: 137  K: 4.2  Cl: 103  CO2: 23  BUN: 14  Cr: 0.82  Glu: 93  Ca: 8.9  ;   LFT - Date: 3/29/23  AST: 33  ALT: 60  ALP: 106  Tbili: 0.3  ;   Coags - Date:   INR:   PT:  Other - Date: 3/29/23 - TSH: 1.100  T4: 5.8    *Physical Exam*  Gen: (+) NAD, (+) well-appearing  HEENT: (+) incision w/ staples c/d/I, (+) MMM, (+) EOMI  Neck: (+) supple  Lungs: (+) CTAB, (-) wheezes, (-) rales, (-) rhonchi  Heart: (+) RRR, (+) S1 S2, (-) murmurs  Pulses: (+) palpable  Abd: (+) soft, (+) NT, (+) ND, (+) BS+  Ext: (-) edema, (-) deformity  MSK: (-) joint swelling  Skin: (+) warm, (+) dry, (-) rash  Neuro: (+) follows commands, (-) tremor, (+) alert, (-) focal weakness

## 2023-04-12 ENCOUNTER — NURSING HOME VISIT (OUTPATIENT)
Dept: POST ACUTE CARE | Facility: EXTERNAL LOCATION | Age: 78
End: 2023-04-12
Payer: MEDICARE

## 2023-04-12 DIAGNOSIS — K59.00 CONSTIPATION, UNSPECIFIED CONSTIPATION TYPE: ICD-10-CM

## 2023-04-12 DIAGNOSIS — M15.9 GENERALIZED OSTEOARTHRITIS OF MULTIPLE SITES: ICD-10-CM

## 2023-04-12 DIAGNOSIS — E03.9 HYPOTHYROIDISM, UNSPECIFIED TYPE: ICD-10-CM

## 2023-04-12 DIAGNOSIS — I48.91 ATRIAL FIBRILLATION, UNSPECIFIED TYPE (MULTI): ICD-10-CM

## 2023-04-12 DIAGNOSIS — E56.9 VITAMIN DEFICIENCY: ICD-10-CM

## 2023-04-12 DIAGNOSIS — F32.A DEPRESSION, UNSPECIFIED DEPRESSION TYPE: ICD-10-CM

## 2023-04-12 DIAGNOSIS — E78.5 HYPERLIPIDEMIA, UNSPECIFIED HYPERLIPIDEMIA TYPE: ICD-10-CM

## 2023-04-12 DIAGNOSIS — G47.00 INSOMNIA, UNSPECIFIED TYPE: ICD-10-CM

## 2023-04-12 DIAGNOSIS — S06.5XAA SDH (SUBDURAL HEMATOMA) (MULTI): Primary | ICD-10-CM

## 2023-04-12 DIAGNOSIS — I10 BENIGN HYPERTENSION: ICD-10-CM

## 2023-04-12 DIAGNOSIS — N40.0 BENIGN PROSTATIC HYPERPLASIA, UNSPECIFIED WHETHER LOWER URINARY TRACT SYMPTOMS PRESENT: ICD-10-CM

## 2023-04-12 PROCEDURE — 99308 SBSQ NF CARE LOW MDM 20: CPT | Performed by: NURSE PRACTITIONER

## 2023-04-12 NOTE — LETTER
Patient: Rylan Garcia  : 1945    Encounter Date: 2023    *Provider Impression*    Patient is a 77 year old male who is seen today for management of multiple medical problems  #SDH / OA - s/p R craniotomy; PT/OT/ST; helmet, acetaminophen 650mg q6h PRN, gluosamine 1000mg QOD, oxycodone 5mg q6h PRN, f/u w/ Dr. Ramos,   #HTN / HLD / A-fib - CoQ10 100mg daily, f/u w/ EP on , omega 3 650mg QOD  #BPH - alfluzosin ER 10mg daily, finasteride 5mg daily  #Hypothyroidism - levothyroxine 25mcg daily  #Constipation - colace 100mg BID, senna 17.2mg daily  #Depression / Insomnia - sertraline 50mg daily, hydroxyzine 25mg q6h PRN, melatonin 5mg QHS  #Vitamin deficiency - vitamin B12 2500mcg daily, multivitamin daily  Follow up as needed      *Chief Complaint*     SDH    *History of Present Illness*    Patient is a 76 y/o male w/ PMH as below who presented as a transfer 3/16/23 with subacute development of left sided weakness, confusion, and incontinence.  CTH showed a new subdural hematomas (R>L).  Patient was taken to the OR 3/17/23 for a R crani for SDH evacuation. CTH improved MLS.  Surgical drain removed POD3.  Electrophysiology consulted for new onset A-fib and Ziopatch placed at discharge w/ plan to follow up with EP 23.  PT/OT evaluated and recommended SNF.  Speech evaluated and patient was cleared for a dysphagia advanced diet with nectar thickened liquids.  Patient was discharged to [facility] in satisfactory condition   with scheduled follow up.      He is seen sitting up in his room and denies any HA, dizziness, vision change, n/v, CP, SOB, cough, numbness, tingling, parestheisas, or any other c/o presently.     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, dementia, hypothyroidism, frequent falls, SDH  PSH - hernia repair, cataract extraction  FH - Father had heart disease, dementia, mother had HTN  SocHx -  former smoker, No EtOH    *Review  of Systems*  All other systems reviewed are negative except as noted in the HPI     *Vital Siins*   Date: 4/12/23  - T: 97.7  P: 60  R: 16  BP: 107/70  SpO2: 96% on RA    *Results / Data*  CBC - Date: 3/29/23  WBC: 7.08  HGB: 13.3  HCT: 38.9  PLT: 354  ;   BMP - Date: 3/29/23  Na: 137  K: 4.2  Cl: 103  CO2: 23  BUN: 14  Cr: 0.82  Glu: 93  Ca: 8.9  ;   LFT - Date: 3/29/23  AST: 33  ALT: 60  ALP: 106  Tbili: 0.3  ;   Coags - Date:   INR:   PT:  Other - Date: 3/29/23 - TSH: 1.100  T4: 5.8    *Physical Exam*  Gen: (+) NAD, (+) well-appearing  HEENT: (+) incision c/d/I, (+) MMM, (+) EOMI  Neck: (+) supple  Lungs: (+) CTAB, (-) wheezes, (-) rales, (-) rhonchi  Heart: (+) RRR, (+) S1 S2, (-) murmurs  Pulses: (+) palpable  Abd: (+) soft, (+) NT, (+) ND, (+) BS+  Ext: (-) edema, (-) deformity  MSK: (-) joint swelling  Skin: (+) warm, (+) dry, (-) rash  Neuro: (+) follows commands, (-) tremor, (+) alert, (-) focal weakness      Electronically Signed By: GIN Mcnulty-CNP   4/18/23 12:07 PM

## 2023-04-13 ENCOUNTER — NURSING HOME VISIT (OUTPATIENT)
Dept: POST ACUTE CARE | Facility: EXTERNAL LOCATION | Age: 78
End: 2023-04-13
Payer: MEDICARE

## 2023-04-13 DIAGNOSIS — G31.85 CORTICOBASAL DEGENERATION (CMS-HCC): ICD-10-CM

## 2023-04-13 DIAGNOSIS — N40.1 BENIGN PROSTATIC HYPERPLASIA WITH LOWER URINARY TRACT SYMPTOMS, SYMPTOM DETAILS UNSPECIFIED: ICD-10-CM

## 2023-04-13 DIAGNOSIS — S06.5XAA SDH (SUBDURAL HEMATOMA) (MULTI): ICD-10-CM

## 2023-04-13 PROCEDURE — 99309 SBSQ NF CARE MODERATE MDM 30: CPT | Performed by: FAMILY MEDICINE

## 2023-04-13 NOTE — LETTER
Patient: Rylan Garcia  : 1945    Encounter Date: 2023    Resident seen 23 -- MP    : 1945  SNF H&P done 3/23/23  Allergy: Aricept  DNR-CCA    S: 79 yo man with dementia, hypothyroidism, HTN and recent fall with CHI, SHD s/p drain and new pAFIB in RVR. Wife reports NO constipation, and notes change/resistance to her RAIKI therapy -- concerned about new bleed. Patient denies HA. No change in neurochecks per Nursing. No CP/SOB. Med List & Problem list reviewed.    O: VSS AFEB 153# (23) Awake, pleasantly confused, NAD. Chest cta. Heart rrr. Ext no c/c/e. Left hemiplegia. PERRL. EOMI. No tongue deviation.    A/P:  # Gait Instability/Falls/CHI/Left hemiplegia d/t SDH: fall precautions. PT/OT. RI recommend following through on upcoming brain CT prior to Neurosurgery follow up. Nursing to reassure family there is no change in neurologic status to suggest new or progressive brain bleed.  # Dementia/corticobasal degeneration (G31.85): ST  # OA: oxycodone prn.  # BPH w LUTS: alfuzosin 10, finasteride  # MDD: sertraline  # Hypothyroidism: 25 mcg LT4  # HTN: stable on alpha blocker.  # pAFIB: monitor HR. No OAC or BB d/t frequent falls.  # No constipation since stopped colace.      Electronically Signed By: David Hong MD   23  6:19 PM

## 2023-04-16 NOTE — PROGRESS NOTES
Resident seen 23 -- MP    : 1945  SNF H&P done 3/23/23  Allergy: Aricept  DNR-CCA    S: 79 yo man with dementia, hypothyroidism, HTN and recent fall with CHI, SHD s/p drain and new pAFIB in RVR. Wife reports NO constipation, and notes change/resistance to her RAIKI therapy -- concerned about new bleed. Patient denies HA. No change in neurochecks per Nursing. No CP/SOB. Med List & Problem list reviewed.    O: VSS AFEB 153# (23) Awake, pleasantly confused, NAD. Chest cta. Heart rrr. Ext no c/c/e. Left hemiplegia. PERRL. EOMI. No tongue deviation.    A/P:  # Gait Instability/Falls/CHI/Left hemiplegia d/t SDH: fall precautions. PT/OT. RI recommend following through on upcoming brain CT prior to Neurosurgery follow up. Nursing to reassure family there is no change in neurologic status to suggest new or progressive brain bleed.  # Dementia/corticobasal degeneration (G31.85): ST  # OA: oxycodone prn.  # BPH w LUTS: alfuzosin 10, finasteride  # MDD: sertraline  # Hypothyroidism: 25 mcg LT4  # HTN: stable on alpha blocker.  # pAFIB: monitor HR. No OAC or BB d/t frequent falls.  # No constipation since stopped colace.

## 2023-04-16 NOTE — PROGRESS NOTES
Resident seen 4/10/23 -- MP    : 1945  SNF H&P done 3/23/23  Allergy: Aricept  DNR-CCA    S: 77 yo man with dementia, hypothyroidism, HTN and recent fall with CHI, SHD s/p drain and new pAFIB in RVR. Craniotomy staples removed per neurosurgery. No CP/SOB. Med List & Problem list reviewed.    O: VSS AFEB 153# (23) Awake, pleasantly confused, NAD. Chest cta. Heart rrr. Ext no c/c/e. Left hemiplegia.    A/P:  # Gait Instability/Falls/CHI/Left hemiplegia d/t SDH: fall precautions. PT/OT.  # Dementia/corticobasal degeneration (G31.85): ST  # OA: oxycodone prn.  # BPH w LUTS: alfuzosin 10, finasteride  # MDD: sertraline  # Hypothyroidism: 25 mcg LT4  # HTN: stable on alpha blocker.  # pAFIB: monitor HR. No OAC or BB d/t frequent falls.

## 2023-04-18 NOTE — PROGRESS NOTES
*Provider Impression*    Patient is a 77 year old male who is seen today for management of multiple medical problems  #SDH / OA - s/p R craniotomy; PT/OT/ST; helmet, acetaminophen 650mg q6h PRN, gluosamine 1000mg QOD, oxycodone 5mg q6h PRN, f/u w/ Dr. Ramos,   #HTN / HLD / A-fib - CoQ10 100mg daily, f/u w/ EP on 4/26, omega 3 650mg QOD  #BPH - alfluzosin ER 10mg daily, finasteride 5mg daily  #Hypothyroidism - levothyroxine 25mcg daily  #Constipation - colace 100mg BID, senna 17.2mg daily  #Depression / Insomnia - sertraline 50mg daily, hydroxyzine 25mg q6h PRN, melatonin 5mg QHS  #Vitamin deficiency - vitamin B12 2500mcg daily, multivitamin daily  Follow up as needed      *Chief Complaint*     SDH    *History of Present Illness*    Patient is a 76 y/o male w/ PMH as below who presented as a transfer 3/16/23 with subacute development of left sided weakness, confusion, and incontinence.  CTH showed a new subdural hematomas (R>L).  Patient was taken to the OR 3/17/23 for a R crani for SDH evacuation. CTH improved MLS.  Surgical drain removed POD3.  Electrophysiology consulted for new onset A-fib and Ziopatch placed at discharge w/ plan to follow up with EP 4/26/23.  PT/OT evaluated and recommended SNF.  Speech evaluated and patient was cleared for a dysphagia advanced diet with nectar thickened liquids.  Patient was discharged to [facility] in satisfactory condition   with scheduled follow up.      He is seen sitting up in his room and denies any HA, dizziness, vision change, n/v, CP, SOB, cough, numbness, tingling, parestheisas, or any other c/o presently.     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, dementia, hypothyroidism, frequent falls, SDH  PSH - hernia repair, cataract extraction  FH - Father had heart disease, dementia, mother had HTN  SocHx -  former smoker, No EtOH    *Review of Systems*  All other systems reviewed are negative except as noted in  the HPI     *Vital Siins*   Date: 4/12/23  - T: 97.7  P: 60  R: 16  BP: 107/70  SpO2: 96% on RA    *Results / Data*  CBC - Date: 3/29/23  WBC: 7.08  HGB: 13.3  HCT: 38.9  PLT: 354  ;   BMP - Date: 3/29/23  Na: 137  K: 4.2  Cl: 103  CO2: 23  BUN: 14  Cr: 0.82  Glu: 93  Ca: 8.9  ;   LFT - Date: 3/29/23  AST: 33  ALT: 60  ALP: 106  Tbili: 0.3  ;   Coags - Date:   INR:   PT:  Other - Date: 3/29/23 - TSH: 1.100  T4: 5.8    *Physical Exam*  Gen: (+) NAD, (+) well-appearing  HEENT: (+) incision c/d/I, (+) MMM, (+) EOMI  Neck: (+) supple  Lungs: (+) CTAB, (-) wheezes, (-) rales, (-) rhonchi  Heart: (+) RRR, (+) S1 S2, (-) murmurs  Pulses: (+) palpable  Abd: (+) soft, (+) NT, (+) ND, (+) BS+  Ext: (-) edema, (-) deformity  MSK: (-) joint swelling  Skin: (+) warm, (+) dry, (-) rash  Neuro: (+) follows commands, (-) tremor, (+) alert, (-) focal weakness

## 2023-05-04 ENCOUNTER — NURSING HOME VISIT (OUTPATIENT)
Dept: POST ACUTE CARE | Facility: EXTERNAL LOCATION | Age: 78
End: 2023-05-04
Payer: MEDICARE

## 2023-05-04 DIAGNOSIS — N40.1 BENIGN PROSTATIC HYPERPLASIA WITH LOWER URINARY TRACT SYMPTOMS, SYMPTOM DETAILS UNSPECIFIED: ICD-10-CM

## 2023-05-04 DIAGNOSIS — M15.9 GENERALIZED OSTEOARTHRITIS OF MULTIPLE SITES: Primary | ICD-10-CM

## 2023-05-04 PROCEDURE — 99308 SBSQ NF CARE LOW MDM 20: CPT | Performed by: FAMILY MEDICINE

## 2023-05-04 NOTE — LETTER
Patient: Rylan Garcia  : 1945    Encounter Date: 2023    Resident seen 23 -- MP    CC: [facility] (Nat) Recheck    : 1945  SNF H&P done 3/23/23  Allergy: Aricept  DNR-CCA    S: 77 yo man with dementia, hypothyroidism, HTN and recent fall with CHI, SHD s/p drain and pAFIB in RVR. No CP/SOB. Med List & Problem list reviewed.    O: VSS AFEB 153# (23) Awake, pleasantly confused, NAD. Chest cta. Heart rrr. Ext no c/c/e. Left hemiplegia. PERRL. EOMI. No tongue deviation.    A/P:  # Gait Instability/Falls/CHI/Left hemiplegia d/t SDH: fall precautions. Completed SNF PT/OT. I recommend following through on upcoming brain CT prior to Neurosurgery follow up. Nursing to reassure family there is no change in neurologic status to suggest new or progressive brain bleed.  # Dementia/corticobasal degeneration (G31.85): ST  # OA: oxycodone prn.  # BPH w LUTS: alfuzosin 10, finasteride  # MDD: sertraline  # Hypothyroidism: 25 mcg LT4  # HTN: stable on alpha blocker.  # pAFIB: monitor HR. No OAC or BB d/t frequent falls.  # No constipation since stopped colace.      Electronically Signed By: David Hong MD   23 10:04 PM

## 2023-05-05 NOTE — PROGRESS NOTES
Resident seen 23 -- MP    CC: [facility] (Nat) Recheck    : 1945  SNF H&P done 3/23/23  Allergy: Aricept  DNR-CCA    S: 77 yo man with dementia, hypothyroidism, HTN and recent fall with CHI, SHD s/p drain and pAFIB in RVR. No CP/SOB. Med List & Problem list reviewed.    O: VSS AFEB 153# (23) Awake, pleasantly confused, NAD. Chest cta. Heart rrr. Ext no c/c/e. Left hemiplegia. PERRL. EOMI. No tongue deviation.    A/P:  # Gait Instability/Falls/CHI/Left hemiplegia d/t SDH: fall precautions. Completed SNF PT/OT. I recommend following through on upcoming brain CT prior to Neurosurgery follow up. Nursing to reassure family there is no change in neurologic status to suggest new or progressive brain bleed.  # Dementia/corticobasal degeneration (G31.85): ST  # OA: oxycodone prn.  # BPH w LUTS: alfuzosin 10, finasteride  # MDD: sertraline  # Hypothyroidism: 25 mcg LT4  # HTN: stable on alpha blocker.  # pAFIB: monitor HR. No OAC or BB d/t frequent falls.  # No constipation since stopped colace.

## 2023-05-12 ENCOUNTER — NURSING HOME VISIT (OUTPATIENT)
Dept: POST ACUTE CARE | Facility: EXTERNAL LOCATION | Age: 78
End: 2023-05-12
Payer: MEDICARE

## 2023-05-12 DIAGNOSIS — K59.00 CONSTIPATION, UNSPECIFIED CONSTIPATION TYPE: ICD-10-CM

## 2023-05-12 DIAGNOSIS — E03.9 HYPOTHYROIDISM, UNSPECIFIED TYPE: ICD-10-CM

## 2023-05-12 DIAGNOSIS — E56.9 VITAMIN DEFICIENCY: ICD-10-CM

## 2023-05-12 DIAGNOSIS — G47.00 INSOMNIA, UNSPECIFIED TYPE: ICD-10-CM

## 2023-05-12 DIAGNOSIS — I48.91 ATRIAL FIBRILLATION, UNSPECIFIED TYPE (MULTI): ICD-10-CM

## 2023-05-12 DIAGNOSIS — S06.5XAA SDH (SUBDURAL HEMATOMA) (MULTI): Primary | ICD-10-CM

## 2023-05-12 DIAGNOSIS — N40.0 BENIGN PROSTATIC HYPERPLASIA, UNSPECIFIED WHETHER LOWER URINARY TRACT SYMPTOMS PRESENT: ICD-10-CM

## 2023-05-12 DIAGNOSIS — E78.5 HYPERLIPIDEMIA, UNSPECIFIED HYPERLIPIDEMIA TYPE: ICD-10-CM

## 2023-05-12 DIAGNOSIS — M15.9 GENERALIZED OSTEOARTHRITIS OF MULTIPLE SITES: ICD-10-CM

## 2023-05-12 DIAGNOSIS — I10 BENIGN HYPERTENSION: ICD-10-CM

## 2023-05-12 DIAGNOSIS — F32.A DEPRESSION, UNSPECIFIED DEPRESSION TYPE: ICD-10-CM

## 2023-05-12 PROCEDURE — 99309 SBSQ NF CARE MODERATE MDM 30: CPT | Performed by: NURSE PRACTITIONER

## 2023-05-19 NOTE — PROGRESS NOTES
*Provider Impression*    Patient is a 77 year old male who is seen today for management of multiple medical problems  #SDH / OA - s/p R craniotomy; helmet, acetaminophen 650mg q6h PRN, gluosamine 1000mg QOD, oxycodone 5mg q6h PRN, f/u w/ Dr. Ramos, PT/OT eval, check CBC w/ diff, CMP, TSH and UA c&s; neurocheck [id number],  #HTN / HLD / A-fib - CoQ10 100mg daily, f/u w/ EP on 4/26, omega 3 650mg QOD  #BPH - alfluzosin ER 10mg daily, finasteride 5mg daily  #Hypothyroidism - levothyroxine 25mcg daily  #Constipation - colace 100mg BID, senna 17.2mg daily  #Depression / Insomnia - sertraline 50mg daily, hydroxyzine 25mg q6h PRN, melatonin 5mg QHS  #Vitamin deficiency - vitamin B12 2500mcg daily, multivitamin daily  Follow up as needed      *Chief Complaint*     SDH    *History of Present Illness*    Patient is a 76 y/o male w/ PMH as below who presented as a transfer 3/16/23 with subacute development of left sided weakness, confusion, and incontinence.  CTH showed a new subdural hematomas (R>L).  Patient was taken to the OR 3/17/23 for a R crani for SDH evacuation. CTH improved MLS.  Surgical drain removed POD3.  Electrophysiology consulted for new onset A-fib and Ziopatch placed at discharge w/ plan to follow up with EP 4/26/23.  PT/OT evaluated and recommended SNF.  Speech evaluated and patient was cleared for a dysphagia advanced diet with nectar thickened liquids.  Patient was discharged to [facility] in satisfactory condition   with scheduled follow up.      Nursing staff report family is concerned he is leaning more, has some urinary incontinence, starting to cough - not even when eating or drinking, he has CT scan on Thursday.    He is seen sitting up in his room today visiting with his wife who reports it's been progressing over the course of the week, he had a couple, falls recently, no head injury just onto his bottom, little bit dizzy, not with change in position, no HA, vision changes, no new weakness, LUTS,  no new numbness, tingling, paresthesias, or any other c/o presently.    We discuss a variety of evaluation and management options. She is in agreement w/ plan as indicated.     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, dementia, hypothyroidism, frequent falls, SDH  PSH - hernia repair, cataract extraction  FH - Father had heart disease, dementia, mother had HTN  SocHx -  former smoker, No EtOH    *Review of Systems*  All other systems reviewed are negative except as noted in the HPI     *Vital Siins*   Date: 4/12/23  - T: 97.7  P: 60  R: 16  BP: 107/70  SpO2: 96% on RA    *Results / Data*  CBC - Date: 3/29/23  WBC: 7.08  HGB: 13.3  HCT: 38.9  PLT: 354  ;   BMP - Date: 3/29/23  Na: 137  K: 4.2  Cl: 103  CO2: 23  BUN: 14  Cr: 0.82  Glu: 93  Ca: 8.9  ;   LFT - Date: 3/29/23  AST: 33  ALT: 60  ALP: 106  Tbili: 0.3  ;   Coags - Date:   INR:   PT:  Other - Date: 3/29/23 - TSH: 1.100  T4: 5.8    *Physical Exam*  Gen: (+) NAD, (+) well-appearing  HEENT: (+) incision, (+) MMM, (+) EOMI  Neck: (+) supple  Lungs: (+) CTAB, (-) wheezes, (-) rales, (-) rhonchi  Heart: (+) RRR, (+) S1 S2, (-) murmurs  Pulses: (+) palpable  Abd: (+) soft, (+) NT, (+) ND, (+) BS+  Ext: (-) edema, (-) deformity  MSK: (-) joint swelling  Skin: (+) warm, (+) dry, (-) rash  Neuro: (+) follows commands, (-) tremor, (+) alert, (-) focal weakness

## 2023-06-08 ENCOUNTER — NURSING HOME VISIT (OUTPATIENT)
Dept: POST ACUTE CARE | Facility: EXTERNAL LOCATION | Age: 78
End: 2023-06-08
Payer: MEDICARE

## 2023-06-08 DIAGNOSIS — R13.10 DYSPHAGIA, UNSPECIFIED TYPE: ICD-10-CM

## 2023-06-08 DIAGNOSIS — M19.90 OSTEOARTHRITIS, UNSPECIFIED OSTEOARTHRITIS TYPE, UNSPECIFIED SITE: ICD-10-CM

## 2023-06-08 DIAGNOSIS — N40.1 BENIGN PROSTATIC HYPERPLASIA WITH LOWER URINARY TRACT SYMPTOMS, SYMPTOM DETAILS UNSPECIFIED: ICD-10-CM

## 2023-06-08 PROCEDURE — 99309 SBSQ NF CARE MODERATE MDM 30: CPT | Performed by: FAMILY MEDICINE

## 2023-06-08 NOTE — PROGRESS NOTES
Resident seen 23 -- MP    CC: [facility] (Nat) Recheck    : 1945  SNF H&P done 3/23/23  Allergy: Aricept  DNR-CCA    S: 79 yo man with dementia, hypothyroidism, HTN and recent fall with CHI, SHD s/p drain and pAFIB in RVR. Choking episode 23 resolved with heimlich -- no respiratory symptoms. No CP/SOB. Med List & Problem list reviewed.    O: VSS AFEB 151# (down 2#) Awake, pleasantly confused, NAD. Chest cta. Heart rrr. Ext no c/c/e. Left hemiplegia. PERRL. EOMI. No tongue deviation.    A/P:  # Dysphagia: part B ST.  # Gait Instability/Falls/CHI/Left hemiplegia d/t SDH: fall precautions. Completed SNF PT/OT. Results pending on recent brain [facility] 23. Nursing to reassure family there is no change in neurologic status to suggest new or progressive brain bleed.  # Dementia/corticobasal degeneration (G31.85): ST  # OA: oxycodone prn.  # BPH w LUTS: alfuzosin 10, finasteride  # MDD: sertraline  # Hypothyroidism: 25 mcg LT4  # HTN: stable on alpha blocker.  # pAFIB: monitor HR. No OAC or BB d/t frequent falls.  # No constipation since stopped colace.

## 2023-06-08 NOTE — LETTER
Patient: Rylan Garcia  : 1945    Encounter Date: 2023    Resident seen 23 -- MP    CC: [facility] ([facility]) Recheck    : 1945  SNF H&P done 3/23/23  Allergy: Aricept  DNR-CCA    S: 79 yo man with dementia, hypothyroidism, HTN and recent fall with CHI, SHD s/p drain and pAFIB in RVR. Choking episode 23 resolved with heimlich -- no respiratory symptoms. No CP/SOB. Med List & Problem list reviewed.    O: VSS AFEB 151# (down 2#) Awake, pleasantly confused, NAD. Chest cta. Heart rrr. Ext no c/c/e. Left hemiplegia. PERRL. EOMI. No tongue deviation.    A/P:  # Dysphagia: part B ST.  # Gait Instability/Falls/CHI/Left hemiplegia d/t SDH: fall precautions. Completed SNF PT/OT. Results pending on recent brain CT [facility] 23. Nursing to reassure family there is no change in neurologic status to suggest new or progressive brain bleed.  # Dementia/corticobasal degeneration (G31.85): ST  # OA: oxycodone prn.  # BPH w LUTS: alfuzosin 10, finasteride  # MDD: sertraline  # Hypothyroidism: 25 mcg LT4  # HTN: stable on alpha blocker.  # pAFIB: monitor HR. No OAC or BB d/t frequent falls.  # No constipation since stopped colace.      Electronically Signed By: David Hong MD   23  6:06 PM

## 2023-07-13 ENCOUNTER — NURSING HOME VISIT (OUTPATIENT)
Dept: POST ACUTE CARE | Facility: EXTERNAL LOCATION | Age: 78
End: 2023-07-13
Payer: MEDICARE

## 2023-07-13 DIAGNOSIS — N40.1 BENIGN PROSTATIC HYPERPLASIA WITH LOWER URINARY TRACT SYMPTOMS, SYMPTOM DETAILS UNSPECIFIED: ICD-10-CM

## 2023-07-13 DIAGNOSIS — R13.10 DYSPHAGIA, UNSPECIFIED TYPE: ICD-10-CM

## 2023-07-13 PROCEDURE — 99308 SBSQ NF CARE LOW MDM 20: CPT | Performed by: FAMILY MEDICINE

## 2023-07-13 NOTE — PROGRESS NOTES
Resident seen 23 -- MP    CC: [facility] (Nat) Recheck    : 1945  SNF H&P done 3/23/23  Allergy: Aricept  DNR-CCA    S: 79 yo man with dementia, hypothyroidism, HTN and recent fall with CHI, SHD s/p drain and pAFIB in RVR. No further choking spells. No falls in past 30 days! No CP/SOB. Med List & Problem list reviewed.    O: VSS AFEB 154# (up 3#) Awake, pleasantly confused, NAD. Chest cta. Heart rrr. Ext no c/c/e. Left hemiplegia. PERRL. EOMI. No tongue deviation.    A/P:  # Dysphagia: Mech/nectar. completed part B STx.  # Gait Instability/Falls/CHI/Left hemiplegia d/t SDH: fall precautions. Completed SNF PT/OT. Results pending on recent brain CT/ [facility] 23.  # Dementia/corticobasal degeneration (G31.85):LTC  # OA: oxycodone prn.  # BPH w LUTS: alfuzosin 10, finasteride  # MDD: sertraline  # Hypothyroidism: 25 mcg LT4  # HTN: stable on alpha blocker.  # pAFIB: monitor HR. No OAC or BB d/t frequent falls.  # No constipation since stopped colace.

## 2023-07-13 NOTE — LETTER
Patient: Rylan Garcia  : 1945    Encounter Date: 2023    Resident seen 23 -- MP    CC: [facility]TC ([facility]) Recheck    : 1945  SNF H&P done 3/23/23  Allergy: Aricept  DNR-CCA    S: 79 yo man with dementia, hypothyroidism, HTN and recent fall with CHI, SHD s/p drain and pAFIB in RVR. No further choking spells. No falls in past 30 days! No CP/SOB. Med List & Problem list reviewed.    O: VSS AFEB 154# (up 3#) Awake, pleasantly confused, NAD. Chest cta. Heart rrr. Ext no c/c/e. Left hemiplegia. PERRL. EOMI. No tongue deviation.    A/P:  # Dysphagia: Mech/nectar. completed part B STx.  # Gait Instability/Falls/CHI/Left hemiplegia d/t SDH: fall precautions. Completed SNF PT/OT. Results pending on recent brain CT/ [facility] 23.  # Dementia/corticobasal degeneration (G31.85):LTC  # OA: oxycodone prn.  # BPH w LUTS: alfuzosin 10, finasteride  # MDD: sertraline  # Hypothyroidism: 25 mcg LT4  # HTN: stable on alpha blocker.  # pAFIB: monitor HR. No OAC or BB d/t frequent falls.  # No constipation since stopped colace.      Electronically Signed By: David Hong MD   23  2:48 PM

## 2023-07-31 ENCOUNTER — NURSING HOME VISIT (OUTPATIENT)
Dept: POST ACUTE CARE | Facility: EXTERNAL LOCATION | Age: 78
End: 2023-07-31
Payer: MEDICARE

## 2023-07-31 DIAGNOSIS — M19.90 OSTEOARTHRITIS, UNSPECIFIED OSTEOARTHRITIS TYPE, UNSPECIFIED SITE: ICD-10-CM

## 2023-07-31 DIAGNOSIS — R29.898 WEAKNESS OF UPPER EXTREMITY: ICD-10-CM

## 2023-07-31 PROCEDURE — 99308 SBSQ NF CARE LOW MDM 20: CPT | Performed by: FAMILY MEDICINE

## 2023-07-31 NOTE — PROGRESS NOTES
Resident seen 23 -- MP    CC: [facility] (Nat) Recheck    : 1945  SNF H&P done 3/23/23  Allergy: Aricept  DNR-CCA    S: 77 yo man with dementia, hypothyroidism, HTN and recent fall with CHI, SHD s/p drain and pAFIB in RVR. No further choking spells. Fell 23 found on bottom sitting in BR doorway. Dtr noted decreased use of right arm and shoulder deformity 23. Patient denies pain. No CP/SOB. Med List & Problem list reviewed.    O: VSS AFEB 154# (23) Awake, pleasantly confused, NAD. Chest cta. Heart rrr. Ext no c/c/e. Left hemiplegia. PERRL. EOMI. No tongue deviation. Painless and full passive ROM b/l shoulders. no deformity to SC, AC or GH joints. Weakness in b/l UE.    A/P:  # UE weakness: part B PT eval and treat.  # Dysphagia: Mech/nectar. completed part B STx.  # Gait Instability/Falls/CHI/Left hemiplegia d/t SDH: fall precautions. Completed SNF PT/OT. Results pending on recent brain [facility] 23.  # Dementia/corticobasal degeneration (G31.85):[facility]  # OA: pain controlled on routine tylenol.  # BPH w LUTS: alfuzosin 10, finasteride  # MDD: sertraline  # Hypothyroidism: 25 mcg LT4  # HTN: stable on alpha blocker.  # pAFIB: monitor HR. No OAC or BB d/t frequent falls.  # No constipation since stopped colace.

## 2023-07-31 NOTE — LETTER
Patient: Rylan Garcia  : 1945    Encounter Date: 2023    Resident seen 23 -- MP    CC: [facility] ([facility]) Recheck    : 1945  SNF H&P done 3/23/23  Allergy: Aricept  DNR-CCA    S: 79 yo man with dementia, hypothyroidism, HTN and recent fall with CHI, SHD s/p drain and pAFIB in RVR. No further choking spells. Fell 23 found on bottom sitting in BR doorway. Dtr noted decreased use of right arm and shoulder deformity 23. Patient denies pain. No CP/SOB. Med List & Problem list reviewed.    O: VSS AFEB 154# (23) Awake, pleasantly confused, NAD. Chest cta. Heart rrr. Ext no c/c/e. Left hemiplegia. PERRL. EOMI. No tongue deviation. Painless and full passive ROM b/l shoulders. no deformity to SC, AC or GH joints. Weakness in b/l UE.    A/P:  # UE weakness: part B PT eval and treat.  # Dysphagia: Mech/nectar. completed part B STx.  # Gait Instability/Falls/CHI/Left hemiplegia d/t SDH: fall precautions. Completed SNF PT/OT. Results pending on recent brain CT[facility] 23.  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  # BPH w LUTS: alfuzosin 10, finasteride  # MDD: sertraline  # Hypothyroidism: 25 mcg LT4  # HTN: stable on alpha blocker.  # pAFIB: monitor HR. No OAC or BB d/t frequent falls.  # No constipation since stopped colace.      Electronically Signed By: David Hong MD   23  2:51 PM

## 2023-08-02 ENCOUNTER — NURSING HOME VISIT (OUTPATIENT)
Dept: POST ACUTE CARE | Facility: EXTERNAL LOCATION | Age: 78
End: 2023-08-02
Payer: MEDICARE

## 2023-08-02 DIAGNOSIS — I48.91 ATRIAL FIBRILLATION, UNSPECIFIED TYPE (MULTI): ICD-10-CM

## 2023-08-02 DIAGNOSIS — K59.00 CONSTIPATION, UNSPECIFIED CONSTIPATION TYPE: ICD-10-CM

## 2023-08-02 DIAGNOSIS — E03.9 HYPOTHYROIDISM, UNSPECIFIED TYPE: ICD-10-CM

## 2023-08-02 DIAGNOSIS — N40.0 BENIGN PROSTATIC HYPERPLASIA, UNSPECIFIED WHETHER LOWER URINARY TRACT SYMPTOMS PRESENT: ICD-10-CM

## 2023-08-02 DIAGNOSIS — I10 BENIGN HYPERTENSION: ICD-10-CM

## 2023-08-02 DIAGNOSIS — F32.A DEPRESSION, UNSPECIFIED DEPRESSION TYPE: ICD-10-CM

## 2023-08-02 DIAGNOSIS — E56.9 VITAMIN DEFICIENCY: ICD-10-CM

## 2023-08-02 DIAGNOSIS — M15.9 GENERALIZED OSTEOARTHRITIS OF MULTIPLE SITES: ICD-10-CM

## 2023-08-02 DIAGNOSIS — F03.90 DEMENTIA, UNSPECIFIED DEMENTIA SEVERITY, UNSPECIFIED DEMENTIA TYPE, UNSPECIFIED WHETHER BEHAVIORAL, PSYCHOTIC, OR MOOD DISTURBANCE OR ANXIETY (MULTI): ICD-10-CM

## 2023-08-02 DIAGNOSIS — S06.5XAA SDH (SUBDURAL HEMATOMA) (MULTI): Primary | ICD-10-CM

## 2023-08-02 DIAGNOSIS — G47.00 INSOMNIA, UNSPECIFIED TYPE: ICD-10-CM

## 2023-08-02 DIAGNOSIS — E78.5 HYPERLIPIDEMIA, UNSPECIFIED HYPERLIPIDEMIA TYPE: ICD-10-CM

## 2023-08-02 PROCEDURE — 99309 SBSQ NF CARE MODERATE MDM 30: CPT | Performed by: NURSE PRACTITIONER

## 2023-08-06 NOTE — PROGRESS NOTES
*Provider Impression*    Patient is a 77 year old male who is seen today for management of multiple medical problems  #SDH / OA - s/p R craniotomy; helmet, acetaminophen 650mg q6h PRN, glucosamine chondroitin MSM daily, add lidocaine 4% patch to R shoulder  #HTN / HLD / A-fib - CoQ10 100mg daily, f/u w/ EP on 4/26, omega 3 650mg QOD  #BPH - alfluzosin ER 10mg daily, finasteride 5mg daily  #Hypothyroidism - levothyroxine 25mcg daily  #Constipation - colace 100mg daily PRN, senna 17.2mg daily  #Depression / Insomnia / Dementia - mgmt per psych - sertraline, hydroxyzine, melatonin, oxcarbazepine  #Vitamin deficiency - vitamin B12 2500mcg daily, multivitamin daily  Follow up as needed      *Chief Complaint*     SDH / shoulder pain    *History of Present Illness*    Patient is a 76 y/o male LTC resident of [facility] w/ PMH as below who is seen today for f/u and management of multiple medical problems.    Nursing staff report he c/o some R shoulder stiffness.    He is seen sitting up in his WC today watching TV. He corroborates medicine helps, 0/10, f/c, sweats, CP, SOB, some cough, n/v, constipation, diarrhea, or any other c/o presently.    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, dementia, hypothyroidism, frequent falls, SDH  PSH - hernia repair, cataract extraction  FH - Father had heart disease, dementia, mother had HTN  SocHx -  former smoker, No EtOH    *Review of Systems*  All other systems reviewed are negative except as noted in the HPI     *Vital Siins*   Date: 7/9/23  - T: 98.1  P: 68  R: 18  BP: 115/62  SpO2: 95% on RA    *Results / Data*  CBC - Date: 5/15/23  WBC: 6.13  HGB: 13.5  HCT: 40.0  PLT: 271  ;   BMP - Date: 5/15/23  Na: 142  K: 4.2  Cl: 103  CO2: 23  BUN: 15  Cr: 0.90  Glu: 73  Ca: 9.1  ;   LFT - Date: 5/15/23  AST: 25  ALT: 40  ALP: 108  Tbili: 0.3  ;   Coags - Date:   INR:   PT:  Other - Date: 3/29/23 - TSH: 1.100  T4: 5.8 ; 5/15/23 -  TSH: 1.530    *Physical Exam*  Gen: (+) NAD, (+) well-appearing  HEENT: (+) scar, (+) MMM  Neck: (+) supple  Lungs: (+) CTAB, (-) wheezes, (-) rales, (-) rhonchi  Heart: (+) RRR, (+) S1 S2, (-) murmurs  Pulses: (+) palpable  Abd: (+) soft, (+) NT, (+) ND, (+) BS+  Ext: (-) edema, (-) deformity  MSK: (-) joint swelling  Skin: (+) warm, (+) dry, (-) rash  Neuro: (+) follows commands, (-) tremor, (+) alert, (-) focal weakness

## 2023-08-10 ENCOUNTER — NURSING HOME VISIT (OUTPATIENT)
Dept: POST ACUTE CARE | Facility: EXTERNAL LOCATION | Age: 78
End: 2023-08-10
Payer: MEDICARE

## 2023-08-10 DIAGNOSIS — R13.10 DYSPHAGIA, UNSPECIFIED TYPE: ICD-10-CM

## 2023-08-10 DIAGNOSIS — N40.1 BENIGN PROSTATIC HYPERPLASIA WITH LOWER URINARY TRACT SYMPTOMS, SYMPTOM DETAILS UNSPECIFIED: ICD-10-CM

## 2023-08-10 PROCEDURE — 99308 SBSQ NF CARE LOW MDM 20: CPT | Performed by: FAMILY MEDICINE

## 2023-08-10 NOTE — PROGRESS NOTES
Resident seen 8/10/23 -- MP    CC: [facility] (Nat) Recheck    : 1945  SNF H&P done 3/23/23  Allergy: Aricept  DNR-CCA    S: 79 yo man with dementia, hypothyroidism, HTN and recent fall with CHI, SHD s/p drain and pAFIB in RVR. No further choking spells. Fell 23 found on bottom sitting in BR doorway. Dtr noted decreased use of right arm and shoulder deformity 23. Patient denies pain. No CP/SOB. Med List & Problem list reviewed.    O: VSS AFEB 158# (up 4#) Awake, pleasantly confused, NAD. Chest cta. Heart rrr. Ext no c/c/e. Left hemiplegia.    A/P:  # UE weakness: completed part B PT.  # Dysphagia: Mech/nectar. completed part B STx.  # Gait Instability/Falls/CHI/Left hemiplegia d/t SDH: fall precautions. Completed SNF PT/OT. Results pending on recent brain [facility] 23.  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  # BPH w LUTS: alfuzosin 10, finasteride  # MDD: sertraline  # Hypothyroidism: 25 mcg LT4  # HTN: stable on alpha blocker.  # pAFIB: monitor HR. No OAC or BB d/t frequent falls.  # No constipation since stopped colace.

## 2023-08-10 NOTE — LETTER
Patient: Rylan Garcia  : 1945    Encounter Date: 08/10/2023    Resident seen 8/10/23 -- MP    CC: [facility] ([facility]) Recheck    : 1945  SNF H&P done 3/23/23  Allergy: Aricept  DNR-CCA    S: 79 yo man with dementia, hypothyroidism, HTN and recent fall with CHI, SHD s/p drain and pAFIB in RVR. No further choking spells. Fell 23 found on bottom sitting in BR doorway. Dtr noted decreased use of right arm and shoulder deformity 23. Patient denies pain. No CP/SOB. Med List & Problem list reviewed.    O: VSS AFEB 158# (up 4#) Awake, pleasantly confused, NAD. Chest cta. Heart rrr. Ext no c/c/e. Left hemiplegia.    A/P:  # UE weakness: completed part B PT.  # Dysphagia: Mech/nectar. completed part B STx.  # Gait Instability/Falls/CHI/Left hemiplegia d/t SDH: fall precautions. Completed SNF PT/OT. Results pending on recent brain CT/ [facility] 23.  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  # BPH w LUTS: alfuzosin 10, finasteride  # MDD: sertraline  # Hypothyroidism: 25 mcg LT4  # HTN: stable on alpha blocker.  # pAFIB: monitor HR. No OAC or BB d/t frequent falls.  # No constipation since stopped colace.      Electronically Signed By: David Hong MD   8/10/23  4:58 PM

## 2023-09-14 ENCOUNTER — NURSING HOME VISIT (OUTPATIENT)
Dept: POST ACUTE CARE | Facility: EXTERNAL LOCATION | Age: 78
End: 2023-09-14
Payer: MEDICARE

## 2023-09-14 DIAGNOSIS — R13.10 DYSPHAGIA, UNSPECIFIED TYPE: ICD-10-CM

## 2023-09-14 DIAGNOSIS — N40.1 BENIGN PROSTATIC HYPERPLASIA WITH LOWER URINARY TRACT SYMPTOMS, SYMPTOM DETAILS UNSPECIFIED: ICD-10-CM

## 2023-09-14 PROCEDURE — 99308 SBSQ NF CARE LOW MDM 20: CPT | Performed by: FAMILY MEDICINE

## 2023-09-14 NOTE — LETTER
Patient: Rylan Garcia  : 1945    Encounter Date: 2023    Resident seen 23 -- MP    CC: [facility] ([facility]) Recheck    : 1945  SNF H&P done 3/23/23  Allergy: Aricept  DNR-CCA    S: 79 yo man with dementia, hypothyroidism, HTN and recent fall with CHI, SHD s/p drain and pAFIB in RVR. Last fell 23. No CP/SOB. Med List & Problem list reviewed.    O: VSS AFEB 155# (down 3#) Awake, pleasantly confused, NAD. Chest cta. Heart rrr. Ext no c/c/e. Left hemiplegia.    A/P:  # UE weakness: completed part B PT.  # Dysphagia: Mech/nectar. completed part B STx.  # Gait Instability/Falls/CHI/Left hemiplegia d/t SDH: fall precautions. Completed SNF PT/OT. Results pending on recent brain CT/ [facility] 23.  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  # BPH w LUTS: alfuzosin 10, finasteride  # MDD: sertraline  # Hypothyroidism: 25 mcg LT4  # HTN: stable on alpha blocker.  # pAFIB: monitor HR. No OAC or BB d/t frequent falls.  # No constipation since stopped colace.      Electronically Signed By: David Hong MD   23  5:56 PM

## 2023-09-14 NOTE — PROGRESS NOTES
Resident seen 23 -- MP    CC: [facility] ([facility]) Recheck    : 1945  SNF H&P done 3/23/23  Allergy: Aricept  DNR-CCA    S: 77 yo man with dementia, hypothyroidism, HTN and recent fall with CHI, SHD s/p drain and pAFIB in RVR. Last fell 23. No CP/SOB. Med List & Problem list reviewed.    O: VSS AFEB 155# (down 3#) Awake, pleasantly confused, NAD. Chest cta. Heart rrr. Ext no c/c/e. Left hemiplegia.    A/P:  # UE weakness: completed part B PT.  # Dysphagia: Mech/nectar. completed part B STx.  # Gait Instability/Falls/CHI/Left hemiplegia d/t SDH: fall precautions. Completed SNF PT/OT. Results pending on recent brain CT/ [facility] 23.  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  # BPH w LUTS: alfuzosin 10, finasteride  # MDD: sertraline  # Hypothyroidism: 25 mcg LT4  # HTN: stable on alpha blocker.  # pAFIB: monitor HR. No OAC or BB d/t frequent falls.  # No constipation since stopped colace.

## 2023-10-12 ENCOUNTER — NURSING HOME VISIT (OUTPATIENT)
Dept: POST ACUTE CARE | Facility: EXTERNAL LOCATION | Age: 78
End: 2023-10-12
Payer: MEDICARE

## 2023-10-12 DIAGNOSIS — R13.10 DYSPHAGIA, UNSPECIFIED TYPE: ICD-10-CM

## 2023-10-12 DIAGNOSIS — G31.85 CORTICOBASAL DEGENERATION (CMS-HCC): ICD-10-CM

## 2023-10-12 PROCEDURE — 99308 SBSQ NF CARE LOW MDM 20: CPT | Performed by: FAMILY MEDICINE

## 2023-10-12 NOTE — LETTER
Patient: Rylan Garcia  : 1945    Encounter Date: 10/12/2023    Resident seen 10/12/23 -- MP    CC: [facility] ([facility]) Recheck    : 1945  SNF H&P done 3/23/23  Allergy: Aricept  DNR-CCA    S: 77 yo man with dementia, hypothyroidism, HTN and recent fall with CHI, SHD s/p drain and pAFIB in RVR. Last fell 23. No CP/SOB. Med List & Problem list reviewed.    O: VSS AFEB 154# (down 1#) Awake, pleasantly confused, NAD. Chest cta. Heart rrr. Ext no c/c/e. Left hemiplegia.    A/P:  # UE weakness: LTC. completed part B PT.  # Dysphagia: Mech/nectar. completed part B STx.  # Gait Instability/Falls/CHI/Left hemiplegia d/t SDH: fall precautions. Completed SNF PT/OT. Results pending on recent brain CT/ [facility] 23.  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  # BPH w LUTS: alfuzosin 10, finasteride  # MDD: sertraline  # Hypothyroidism: 25 mcg LT4  # HTN: stable on alpha blocker.  # pAFIB: monitor HR. No OAC or BB d/t frequent falls.  # No constipation since stopped colace.      Electronically Signed By: David Hong MD   10/21/23  2:06 PM

## 2023-10-12 NOTE — PROGRESS NOTES
Resident seen 10/12/23 -- MP    CC: [facility] ([facility] Recheck    : 1945  SNF H&P done 3/23/23  Allergy: Aricept  DNR-CCA    S: 79 yo man with dementia, hypothyroidism, HTN and recent fall with CHI, SHD s/p drain and pAFIB in RVR. Last fell 23. No CP/SOB. Med List & Problem list reviewed.    O: VSS AFEB 154# (down 1#) Awake, pleasantly confused, NAD. Chest cta. Heart rrr. Ext no c/c/e. Left hemiplegia.    A/P:  # UE weakness: LTC. completed part B PT.  # Dysphagia: Mech/nectar. completed part B STx.  # Gait Instability/Falls/CHI/Left hemiplegia d/t SDH: fall precautions. Completed SNF PT/OT. Results pending on recent brain CT/ [facility] 23.  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  # BPH w LUTS: alfuzosin 10, finasteride  # MDD: sertraline  # Hypothyroidism: 25 mcg LT4  # HTN: stable on alpha blocker.  # pAFIB: monitor HR. No OAC or BB d/t frequent falls.  # No constipation since stopped colace.

## 2023-10-16 ENCOUNTER — APPOINTMENT (OUTPATIENT)
Dept: CARDIOLOGY | Facility: CLINIC | Age: 78
End: 2023-10-16
Payer: MEDICARE

## 2023-10-25 PROBLEM — R41.89 IMPAIRED COGNITION: Status: ACTIVE | Noted: 2023-03-25

## 2023-10-25 RX ORDER — ACETAMINOPHEN 500 MG
5 TABLET ORAL NIGHTLY PRN
COMMUNITY
Start: 2023-05-22

## 2023-10-25 RX ORDER — OXCARBAZEPINE 300 MG/1
1 TABLET, FILM COATED ORAL 2 TIMES DAILY
COMMUNITY
Start: 2023-05-22

## 2023-10-25 RX ORDER — DOCUSATE SODIUM 100 MG/1
1 CAPSULE, LIQUID FILLED ORAL DAILY
COMMUNITY
Start: 2023-03-22

## 2023-10-25 RX ORDER — MULTIVITAMIN
1 TABLET ORAL DAILY
COMMUNITY
Start: 2023-05-22

## 2023-10-25 RX ORDER — ALFUZOSIN HYDROCHLORIDE 10 MG/1
1 TABLET, EXTENDED RELEASE ORAL DAILY
COMMUNITY
Start: 2023-05-22

## 2023-10-25 RX ORDER — LEVOTHYROXINE SODIUM 25 UG/1
1 TABLET ORAL DAILY
COMMUNITY
Start: 2019-02-27

## 2023-10-25 RX ORDER — MELATONIN 1 MG/ML
LIQUID (ML) ORAL
COMMUNITY
Start: 2018-09-06

## 2023-10-25 RX ORDER — PETROLATUM,WHITE/LANOLIN
OINTMENT (GRAM) TOPICAL
COMMUNITY

## 2023-10-25 RX ORDER — HYDROXYZINE PAMOATE 25 MG/1
25 CAPSULE ORAL 2 TIMES DAILY
COMMUNITY
Start: 2023-05-22

## 2023-10-25 RX ORDER — EPINEPHRINE 0.22MG
100 AEROSOL WITH ADAPTER (ML) INHALATION DAILY
COMMUNITY
Start: 2020-07-17

## 2023-10-25 RX ORDER — SERTRALINE HYDROCHLORIDE 50 MG/1
1 TABLET, FILM COATED ORAL DAILY
COMMUNITY
Start: 2023-05-22

## 2023-10-25 RX ORDER — FINASTERIDE 5 MG/1
5 TABLET, FILM COATED ORAL DAILY
COMMUNITY
Start: 2023-03-22

## 2023-10-25 NOTE — PROGRESS NOTES
"I had the pleasure seeing Rylan Garcia     Chief Complaint   Patient presents with    SVT and PACs     He is here for a 9 month visit.  He was previously seen by my colleague Jenelle Allan in May 2023.          Rylan Garcia is a 78 y.o. with:     1. HTN and Hypothyroidism  2. 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 Freq falls  4. PACs      March 2023:  He was admitted at [facility] with new subdural hematomas (R>L) after a fall. Patient was taken to the OR (Mar 2023) for SDH evacuation. CTH improved MLS. Surgical drain removed POD3. During the admission, he was diagnosed with new onset A-fib at the time. However, after reviewing his ECGs and event monitor, the episodes are in fact PACS and SVTs with up to 42 beats, with a total burden of 22% (max rate of 178 bpm). HR was 44- bpm (SVT fastest rate).     Jan 2023: (OV with Jenelle Allan) Patient came in on a wheelchair with his wife. His ECG showed sinus rhythm with narrow QRS, no PACs, HR 69 bpm. Auscultation for over one minute revealed only one extra beat.  No AF confirmed.  Pt seems to be progressing well, with very rare PACs and no SVTs during this appointment. No changes at this time.          TESTING:      -TTE:  (Nov 2018) LVEF 55-59%.  LV stage 1 diastolic dysfunction, abn relaxation pattern diastolic filling, and hypertrophy.  No significant valve disease.        Objective   Physical Exam  /71 (BP Location: Right arm)   Pulse 60   Ht 1.753 m (5' 9\")   Wt 67.1 kg (148 lb)   SpO2 98%   BMI 21.86 kg/m²     General Appearance:  Alert, cooperative, no distress, appears stated age   Head:  Normocephalic, without obvious abnormality, atraumatic   Eyes:  PERRL, conjunctiva/corneas clear, EOM's intact, fundi benign, both eyes   Ears:  Normal TM's and external ear canals, both ears   Nose: Nares normal, septum midline, mucosa normal, no drainage or sinus tenderness   Throat: Lips, mucosa, and tongue " normal; teeth and gums normal   Neck: Supple, symmetrical, trachea midline, no adenopathy, thyroid: not enlarged, symmetric, no tenderness/mass/nodules, no carotid bruit or JVD   Back:   Symmetric, no curvature, ROM normal, no CVA tenderness   Lungs:   Clear to auscultation bilaterally, respirations unlabored   Chest Wall:  No tenderness or deformity   Heart:  Regular rate and rhythm, S1, S2 normal, no murmur, rub or gallop   Abdomen:   Soft, non-tender, bowel sounds active all four quadrants,  no masses, no organomegaly   Genitalia:  Normal male   Rectal:  Normal tone, normal prostate, no masses or tenderness;  guaiac negative stool   Extremities: Extremities normal, atraumatic, no cyanosis or edema   Pulses: 2+ and symmetric   Skin: Skin color, texture, turgor normal, no rashes or lesions   Lymph nodes: Cervical, supraclavicular, and axillary nodes normal   Neurologic: Normal          Assessment/Plan   Paroxysmal atrial fibrillation (CMS/HCC)  He does carry diagnosis of AF but all his ECG show NSR with PACs. Some are read by the computer as AF but in fact it is a NSR.     Syncope and collapse  He is here today after he had a syncopal episode. The nurse aides tried to get him up (fairly quickly) from supine to standing position and he lost consciousness. Heart rate was in the 50s. I feel the episode was secondary to orthostatic hypotension. I dont think so there are any cardiac issues. Alfuzosin and finesterade might also have contributed. We discussed the gradual rise and avoiding dehydration. If the episodes recur we will have to work around alpha antagonist.

## 2023-10-27 ENCOUNTER — OFFICE VISIT (OUTPATIENT)
Dept: CARDIOLOGY | Facility: HOSPITAL | Age: 78
End: 2023-10-27
Payer: MEDICARE

## 2023-10-27 VITALS
HEART RATE: 60 BPM | SYSTOLIC BLOOD PRESSURE: 118 MMHG | DIASTOLIC BLOOD PRESSURE: 71 MMHG | HEIGHT: 69 IN | BODY MASS INDEX: 21.92 KG/M2 | WEIGHT: 148 LBS | OXYGEN SATURATION: 98 %

## 2023-10-27 DIAGNOSIS — I48.0 PAROXYSMAL ATRIAL FIBRILLATION (MULTI): Primary | ICD-10-CM

## 2023-10-27 DIAGNOSIS — I47.10 SVT (SUPRAVENTRICULAR TACHYCARDIA) (CMS-HCC): ICD-10-CM

## 2023-10-27 PROBLEM — R55 SYNCOPE AND COLLAPSE: Status: ACTIVE | Noted: 2023-10-27

## 2023-10-27 PROCEDURE — 1036F TOBACCO NON-USER: CPT | Performed by: INTERNAL MEDICINE

## 2023-10-27 PROCEDURE — 3074F SYST BP LT 130 MM HG: CPT | Performed by: INTERNAL MEDICINE

## 2023-10-27 PROCEDURE — 1126F AMNT PAIN NOTED NONE PRSNT: CPT | Performed by: INTERNAL MEDICINE

## 2023-10-27 PROCEDURE — 3078F DIAST BP <80 MM HG: CPT | Performed by: INTERNAL MEDICINE

## 2023-10-27 PROCEDURE — 99215 OFFICE O/P EST HI 40 MIN: CPT | Performed by: INTERNAL MEDICINE

## 2023-10-27 PROCEDURE — 1159F MED LIST DOCD IN RCRD: CPT | Performed by: INTERNAL MEDICINE

## 2023-10-27 ASSESSMENT — ENCOUNTER SYMPTOMS
OCCASIONAL FEELINGS OF UNSTEADINESS: 1
DEPRESSION: 0
LOSS OF SENSATION IN FEET: 0

## 2023-10-27 ASSESSMENT — PATIENT HEALTH QUESTIONNAIRE - PHQ9
2. FEELING DOWN, DEPRESSED OR HOPELESS: NOT AT ALL
1. LITTLE INTEREST OR PLEASURE IN DOING THINGS: NOT AT ALL
SUM OF ALL RESPONSES TO PHQ9 QUESTIONS 1 AND 2: 0
1. LITTLE INTEREST OR PLEASURE IN DOING THINGS: NOT AT ALL
SUM OF ALL RESPONSES TO PHQ9 QUESTIONS 1 & 2: 0
2. FEELING DOWN, DEPRESSED OR HOPELESS: NOT AT ALL

## 2023-10-27 ASSESSMENT — COLUMBIA-SUICIDE SEVERITY RATING SCALE - C-SSRS
2. HAVE YOU ACTUALLY HAD ANY THOUGHTS OF KILLING YOURSELF?: NO
6. HAVE YOU EVER DONE ANYTHING, STARTED TO DO ANYTHING, OR PREPARED TO DO ANYTHING TO END YOUR LIFE?: NO
1. IN THE PAST MONTH, HAVE YOU WISHED YOU WERE DEAD OR WISHED YOU COULD GO TO SLEEP AND NOT WAKE UP?: NO

## 2023-10-27 NOTE — ASSESSMENT & PLAN NOTE
He is here today after he had a syncopal episode. The nurse aides tried to get him up (fairly quickly) from supine to standing position and he lost consciousness. Heart rate was in the 50s. I feel the episode was secondary to orthostatic hypotension. I dont think so there are any cardiac issues. Alfuzosin and finesterade might also have contributed. We discussed the gradual rise and avoiding dehydration. If the episodes recur we will have to work around alpha antagonist.

## 2023-10-27 NOTE — ASSESSMENT & PLAN NOTE
He does carry diagnosis of AF but all his ECG show NSR with PACs. Some are read by the computer as AF but in fact it is a NSR.

## 2023-11-09 ENCOUNTER — NURSING HOME VISIT (OUTPATIENT)
Dept: POST ACUTE CARE | Facility: EXTERNAL LOCATION | Age: 78
End: 2023-11-09
Payer: MEDICARE

## 2023-11-09 DIAGNOSIS — L20.9 XEROSIS DUE TO ATOPIC DERMATITIS: ICD-10-CM

## 2023-11-09 DIAGNOSIS — N40.1 BENIGN PROSTATIC HYPERPLASIA WITH LOWER URINARY TRACT SYMPTOMS, SYMPTOM DETAILS UNSPECIFIED: ICD-10-CM

## 2023-11-09 PROCEDURE — 99309 SBSQ NF CARE MODERATE MDM 30: CPT | Performed by: FAMILY MEDICINE

## 2023-11-09 NOTE — LETTER
Patient: Rylan Garcia  : 1945    Encounter Date: 2023    Resident seen 23 -- MP    CC: LTC (Nat) Recheck    : 1945  SNF H&P done 3/23/23  Allergy: Aricept  DNR-CCA    S: 79 yo man with dementia, hypothyroidism, HTN, recent CHI, SHD s/p drain and pAFIB in RVR. Last fell 23. No CP/SOB. Med List & Problem list reviewed.    O: VSS AFEB 153# (down 1#) Awake, pleasantly confused, NAD. Chest cta. Heart rrr. Ext no c/c/e. Left hemiplegia. Itchy excoriated rash on back of neck.    A/P:  # Xerotic dermatitis: 2.5%HC ointment bid x 1 w.  # UE weakness: LTC. completed part B PT.  # Dysphagia: Mech/nectar. completed part B STx.  # Gait Instability/Falls/CHI/Left hemiplegia d/t SDH: fall precautions. Completed SNF PT/OT. Results pending on recent brain CT/ [facility] follow up 23.  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  # BPH w LUTS: alfuzosin 10, finasteride  # MDD: sertraline  # Hypothyroidism: 25 mcg LT4  # HTN: stable on alpha blocker.  # pAFIB: monitor HR. No OAC or BB d/t frequent falls.  # No constipation since stopped colace.      Electronically Signed By: David Hong MD   23 11:19 AM

## 2023-11-13 PROBLEM — L20.9 XEROSIS DUE TO ATOPIC DERMATITIS: Status: ACTIVE | Noted: 2023-11-13

## 2023-12-14 ENCOUNTER — NURSING HOME VISIT (OUTPATIENT)
Dept: POST ACUTE CARE | Facility: EXTERNAL LOCATION | Age: 78
End: 2023-12-14
Payer: MEDICARE

## 2023-12-14 DIAGNOSIS — N40.1 BENIGN PROSTATIC HYPERPLASIA WITH LOWER URINARY TRACT SYMPTOMS, SYMPTOM DETAILS UNSPECIFIED: ICD-10-CM

## 2023-12-14 DIAGNOSIS — R13.10 DYSPHAGIA, UNSPECIFIED TYPE: ICD-10-CM

## 2023-12-14 PROCEDURE — 99308 SBSQ NF CARE LOW MDM 20: CPT | Performed by: FAMILY MEDICINE

## 2023-12-14 NOTE — PROGRESS NOTES
Resident seen 23 -- MP    CC: LT (Nat) Recheck    : 1945  SNF H&P done 3/23/23  Allergy: Aricept  DNR-CCA    S: 77 yo man with dementia, hypothyroidism, HTN, recent CHI, SHD s/p drain and pAFIB in RVR. Last fell 23. No CP/SOB. Med List & Problem list reviewed.    O: VSS AFEB 153# (stable) Awake, pleasantly confused, NAD. Chest cta. Heart rrr. Ext no c/c/e. Left hemiplegia. Itchy excoriated rash on back of neck.    A/P:  # Xerotic dermatitis: 2.5% HC ointment bid x 1 w.  # UE weakness: LTC. completed part B PT.  # Dysphagia: Mech/nectar. completed part B STx.  # Gait Instability/Falls/CHI/Left hemiplegia d/t SDH: fall precautions. Completed SNF PT/OT. Results pending on recent brain CT/ [facility] follow [facility] 23.  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  # BPH w LUTS: alfuzosin 10, finasteride  # MDD: sertraline  # Hypothyroidism: 25 mcg LT4  # HTN: stable on alpha blocker.  # pAFIB: monitor HR. No OAC or BB d/t frequent falls.  # No constipation since stopped colace.

## 2023-12-14 NOTE — LETTER
Patient: Rylan Garcia  : 1945    Encounter Date: 2023    Resident seen 23 -- MP    CC: LTC (Nat) Recheck    : 1945  SNF H&P done 3/23/23  Allergy: Aricept  DNR-CCA    S: 79 yo man with dementia, hypothyroidism, HTN, recent CHI, SHD s/p drain and pAFIB in RVR. Last fell 23. No CP/SOB. Med List & Problem list reviewed.    O: VSS AFEB 153# (stable) Awake, pleasantly confused, NAD. Chest cta. Heart rrr. Ext no c/c/e. Left hemiplegia. Itchy excoriated rash on back of neck.    A/P:  # Xerotic dermatitis: 2.5% HC ointment bid x 1 w.  # UE weakness: LTC. completed part B PT.  # Dysphagia: Mech/nectar. completed part B STx.  # Gait Instability/Falls/CHI/Left hemiplegia d/t SDH: fall precautions. Completed SNF PT/OT. Results pending on recent brain CT/ [facility] follow [facility] 23.  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  # BPH w LUTS: alfuzosin 10, finasteride  # MDD: sertraline  # Hypothyroidism: 25 mcg LT4  # HTN: stable on alpha blocker.  # pAFIB: monitor HR. No OAC or BB d/t frequent falls.  # No constipation since stopped colace.      Electronically Signed By: David Hong MD   23  3:07 PM

## 2024-01-08 ENCOUNTER — NURSING HOME VISIT (OUTPATIENT)
Dept: POST ACUTE CARE | Facility: EXTERNAL LOCATION | Age: 79
End: 2024-01-08
Payer: MEDICARE

## 2024-01-08 DIAGNOSIS — R13.10 DYSPHAGIA, UNSPECIFIED TYPE: ICD-10-CM

## 2024-01-08 DIAGNOSIS — N40.1 BENIGN PROSTATIC HYPERPLASIA WITH LOWER URINARY TRACT SYMPTOMS, SYMPTOM DETAILS UNSPECIFIED: ICD-10-CM

## 2024-01-08 PROCEDURE — 99308 SBSQ NF CARE LOW MDM 20: CPT | Performed by: FAMILY MEDICINE

## 2024-01-08 NOTE — LETTER
Patient: Rylan Garcia  : 1945    Encounter Date: 2024    Resident seen 24 -- MP    CC: LTC (Nat) Recheck    : 1945  SNF H&P done 3/23/23  Allergy: Aricept  DNR-CCA    S: 77 yo man with dementia, hypothyroidism, HTN, recent CHI, SHD s/p drain and pAFIB in RVR. Last fell 23. No CP/SOB. Med List & Problem list reviewed.    O: VSS AFEB 153# (stable) Awake, pleasantly confused, NAD. Chest cta. Heart rrr. Ext no c/c/e. Left hemiplegia.    A/P:  # Hx Xerotic dermatitis: resolved with 2.5% HC ointment bid x 1 w.  # UE weakness: LTC. completed part B PT.  # Dysphagia: Mech/nectar. completed part B STx.  # Gait Instability/Falls/CHI/Left hemiplegia d/t SDH: fall precautions. Completed SNF PT/OT. Results pending on recent brain CT/ [facility] 23.  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  # BPH w LUTS: alfuzosin 10, finasteride  # MDD: sertraline  # Hypothyroidism: 25 mcg LT4  # HTN: stable on alpha blocker.  # pAFIB: monitor HR. No OAC or BB d/t frequent falls.  # No constipation since stopped colace.      Electronically Signed By: David Hong MD   24  4:46 PM

## 2024-01-08 NOTE — PROGRESS NOTES
Resident seen 24 -- MP    CC: LT (Nat) Recheck    : 1945  SNF H&P done 3/23/23  Allergy: Aricept  DNR-CCA    S: 77 yo man with dementia, hypothyroidism, HTN, recent CHI, SHD s/p drain and pAFIB in RVR. Last fell 23. No CP/SOB. Med List & Problem list reviewed.    O: VSS AFEB 153# (stable) Awake, pleasantly confused, NAD. Chest cta. Heart rrr. Ext no c/c/e. Left hemiplegia.    A/P:  # Hx Xerotic dermatitis: resolved with 2.5% HC ointment bid x 1 w.  # UE weakness: LTC. completed part B PT.  # Dysphagia: Mech/nectar. completed part B STx.  # Gait Instability/Falls/CHI/Left hemiplegia d/t SDH: fall precautions. Completed SNF PT/OT. Results pending on recent brain CT/ [facility] 23.  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  # BPH w LUTS: alfuzosin 10, finasteride  # MDD: sertraline  # Hypothyroidism: 25 mcg LT4  # HTN: stable on alpha blocker.  # pAFIB: monitor HR. No OAC or BB d/t frequent falls.  # No constipation since stopped colace.

## 2024-02-08 ENCOUNTER — NURSING HOME VISIT (OUTPATIENT)
Dept: POST ACUTE CARE | Facility: EXTERNAL LOCATION | Age: 79
End: 2024-02-08
Payer: MEDICARE

## 2024-02-08 DIAGNOSIS — N40.1 BENIGN PROSTATIC HYPERPLASIA WITH LOWER URINARY TRACT SYMPTOMS, SYMPTOM DETAILS UNSPECIFIED: ICD-10-CM

## 2024-02-08 DIAGNOSIS — R13.10 DYSPHAGIA, UNSPECIFIED TYPE: ICD-10-CM

## 2024-02-08 PROCEDURE — 99308 SBSQ NF CARE LOW MDM 20: CPT | Performed by: FAMILY MEDICINE

## 2024-02-08 NOTE — PROGRESS NOTES
Resident seen 24 -- MP    CC: LT (Nat) Recheck    : 1945  SNF H&P done 3/23/23  Allergy: Aricept  DNR-CCA    S: 77 yo man with dementia, hypothyroidism, HTN, recent CHI, SHD s/p drain and pAFIB in RVR. Last fell 23. No CP/SOB. Med List & Problem list reviewed.    O: VSS AFEB 153# (stable) Awake, pleasantly confused, NAD. No hands-on exam on toilet. Left hemiplegia.    A/P:  # UE weakness: LTC. completed part B PT.  # Dysphagia: Mech/nectar. completed part B STx.  # Gait Instability/Falls/CHI/Left hemiplegia d/t SDH: fall precautions. Completed SNF PT/OT. Results pending on recent brain CT/ [facility] follow [facility] 23.  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  # BPH w LUTS: alfuzosin 10, finasteride  # MDD: sertraline  # Hypothyroidism: 25 mcg LT4  # HTN: stable on alpha blocker.  # pAFIB: monitor HR. No OAC or BB d/t frequent falls.  # No constipation since stopped colace.

## 2024-02-08 NOTE — LETTER
Patient: Rylan Garcia  : 1945    Encounter Date: 2024    Resident seen 24 -- MP    CC: LTC (Nat) Recheck    : 1945  SNF H&P done 3/23/23  Allergy: Aricept  DNR-CCA    S: 77 yo man with dementia, hypothyroidism, HTN, recent CHI, SHD s/p drain and pAFIB in RVR. Last fell 23. No CP/SOB. Med List & Problem list reviewed.    O: VSS AFEB 153# (stable) Awake, pleasantly confused, NAD. No hands-on exam on toilet. Left hemiplegia.    A/P:  # UE weakness: LTC. completed part B PT.  # Dysphagia: Mech/nectar. completed part B STx.  # Gait Instability/Falls/CHI/Left hemiplegia d/t SDH: fall precautions. Completed SNF PT/OT. Results pending on recent brain CT/ [facility] follow [facility] 23.  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  # BPH w LUTS: alfuzosin 10, finasteride  # MDD: sertraline  # Hypothyroidism: 25 mcg LT4  # HTN: stable on alpha blocker.  # pAFIB: monitor HR. No OAC or BB d/t frequent falls.  # No constipation since stopped colace.      Electronically Signed By: David Hong MD   24  4:15 PM

## 2024-02-26 ENCOUNTER — NURSING HOME VISIT (OUTPATIENT)
Dept: POST ACUTE CARE | Facility: EXTERNAL LOCATION | Age: 79
End: 2024-02-26
Payer: MEDICARE

## 2024-02-26 DIAGNOSIS — G31.85 DEMENTIA IN CORTICOBASAL DEGENERATION (MULTI): ICD-10-CM

## 2024-02-26 DIAGNOSIS — F33.40 MDD (RECURRENT MAJOR DEPRESSIVE DISORDER) IN REMISSION (CMS-HCC): ICD-10-CM

## 2024-02-26 DIAGNOSIS — F02.80 DEMENTIA IN CORTICOBASAL DEGENERATION (MULTI): ICD-10-CM

## 2024-02-26 PROCEDURE — 99309 SBSQ NF CARE MODERATE MDM 30: CPT | Performed by: FAMILY MEDICINE

## 2024-02-26 NOTE — LETTER
Patient: Rylan Garcia  : 1945    Encounter Date: 2024    Resident seen 24 -- MP    CC: LTC (Nat) Recheck    : 1945  SNF H&P done 3/23/23  Allergy: Aricept  DNR-CCA    S: 77 yo man with dementia, hypothyroidism, HTN, recent CHI, SHD s/p drain and pAFIB in RVR. Last fell 23. Wife concerned about polypharmacy -- particularly zoloft and OXcarbemazepine. No CP/SOB. Med List & Problem list reviewed.    O: VSS AFEB 152# (stable) Awake, pleasantly confused, NAD. Chest CTA. Heart rrr. Wearing soft helmet. Left hemiplegia.    A/P:  # UE weakness: LTC. completed part B PT.  # Dysphagia: Mech/nectar. completed part B STx.  # Gait Instability/Falls/CHI/Left hemiplegia d/t SDH: fall precautions. Completed SNF PT/OT. Results pending on brain CT/ [facility] follow [facility] 23.  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  # OA: pain controlled on routine tylenol.  # BPH w LUTS: alfuzosin 10, finasteride  # MDD: DC sertraline 24 -- moods stable for past year.  # Hypothyroidism: 25 mcg LT4  # HTN: stable on alpha blocker.  # pAFIB: monitor HR. No OAC or BB d/t frequent falls.  # No constipation since stopped colace.      Electronically Signed By: David Hong MD   3/2/24 12:38 PM

## 2024-02-29 PROBLEM — F02.80: Status: ACTIVE | Noted: 2023-04-13

## 2024-02-29 NOTE — PROGRESS NOTES
Resident seen 24 -- MP    CC: LT (Nat) Recheck    : 1945  SNF H&P done 3/23/23  Allergy: Aricept  DNR-CCA    S: 77 yo man with dementia, hypothyroidism, HTN, recent CHI, SHD s/p drain and pAFIB in RVR. Last fell 23. Wife concerned about polypharmacy -- particularly zoloft and OXcarbemazepine. No CP/SOB. Med List & Problem list reviewed.    O: VSS AFEB 152# (stable) Awake, pleasantly confused, NAD. Chest CTA. Heart rrr. Wearing soft helmet. Left hemiplegia.    A/P:  # UE weakness: LTC. completed part B PT.  # Dysphagia: Mech/nectar. completed part B STx.  # Gait Instability/Falls/CHI/Left hemiplegia d/t SDH: fall precautions. Completed SNF PT/OT. Results pending on brain CT/ [facility] follow [facility] 23.  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  # OA: pain controlled on routine tylenol.  # BPH w LUTS: alfuzosin 10, finasteride  # MDD: DC sertraline 24 -- moods stable for past year.  # Hypothyroidism: 25 mcg LT4  # HTN: stable on alpha blocker.  # pAFIB: monitor HR. No OAC or BB d/t frequent falls.  # No constipation since stopped colace.

## 2024-03-14 ENCOUNTER — NURSING HOME VISIT (OUTPATIENT)
Dept: POST ACUTE CARE | Facility: EXTERNAL LOCATION | Age: 79
End: 2024-03-14
Payer: MEDICARE

## 2024-03-14 DIAGNOSIS — F33.42 RECURRENT MAJOR DEPRESSIVE DISORDER, IN FULL REMISSION (CMS-HCC): ICD-10-CM

## 2024-03-14 DIAGNOSIS — L20.9 XEROSIS DUE TO ATOPIC DERMATITIS: ICD-10-CM

## 2024-03-14 PROCEDURE — 99309 SBSQ NF CARE MODERATE MDM 30: CPT | Performed by: FAMILY MEDICINE

## 2024-03-14 NOTE — LETTER
Patient: Rylan Garcia  : 1945    Encounter Date: 2024    Resident seen 3/14/24 -- MP    CC: LTC (Nat) Recheck    : 1945  SNF H&P done 3/23/23  Allergy: Aricept  DNR-CCA    S: 79 yo man with dementia, hypothyroidism, HTN, recent CHI, SHD s/p drain and pAFIB in RVR. No CP/SOB. Med List & Problem list reviewed.    O: VSS AFEB 151# (down 2#) Awake, pleasantly confused, NAD. No hands-on exam on toilet. Left hemiplegia.    A/P:  # Xerotic eczema: HC ointment and prn benadryl.  # UE weakness: LTC. completed part B PT.  # Dysphagia: Mech/nectar. completed part B STx.  # Gait Instability/Falls/CHI/Left hemiplegia d/t SDH: fall precautions. Completed SNF PT/OT. Results pending on recent brain CT/ [facility] follow [facility] 23.  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  # BPH w LUTS: alfuzosin 10, finasteride  # MDD: tolerated GDR sertraline to 50 mg  # Hypothyroidism: 25 mcg LT4  # HTN: stable on alpha blocker.  # pAFIB: monitor HR. No OAC or BB d/t frequent falls.  # No constipation since stopped colace.      Electronically Signed By: David Hong MD   3/30/24  5:42 PM

## 2024-03-27 PROBLEM — F33.42 RECURRENT MAJOR DEPRESSIVE DISORDER, IN FULL REMISSION (CMS-HCC): Status: ACTIVE | Noted: 2024-03-27

## 2024-03-27 NOTE — PROGRESS NOTES
Resident seen 3/14/24 -- MP    CC: LT (Nat) Recheck    : 1945  SNF H&P done 3/23/23  Allergy: Aricept  DNR-CCA    S: 77 yo man with dementia, hypothyroidism, HTN, recent CHI, SHD s/p drain and pAFIB in RVR. No CP/SOB. Med List & Problem list reviewed.    O: VSS AFEB 151# (down 2#) Awake, pleasantly confused, NAD. No hands-on exam on toilet. Left hemiplegia.    A/P:  # Xerotic eczema: HC ointment and prn benadryl.  # UE weakness: LTC. completed part B PT.  # Dysphagia: Mech/nectar. completed part B STx.  # Gait Instability/Falls/CHI/Left hemiplegia d/t SDH: fall precautions. Completed SNF PT/OT. Results pending on recent brain CT/ [facility] follow [facility] 23.  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  # BPH w LUTS: alfuzosin 10, finasteride  # MDD: tolerated GDR sertraline to 50 mg  # Hypothyroidism: 25 mcg LT4  # HTN: stable on alpha blocker.  # pAFIB: monitor HR. No OAC or BB d/t frequent falls.  # No constipation since stopped colace.

## 2024-04-11 ENCOUNTER — NURSING HOME VISIT (OUTPATIENT)
Dept: POST ACUTE CARE | Facility: EXTERNAL LOCATION | Age: 79
End: 2024-04-11

## 2024-04-11 DIAGNOSIS — I10 PRIMARY HYPERTENSION: ICD-10-CM

## 2024-04-11 DIAGNOSIS — E03.9 HYPOTHYROIDISM, ADULT: ICD-10-CM

## 2024-04-11 DIAGNOSIS — N40.1 BENIGN PROSTATIC HYPERPLASIA WITH LOWER URINARY TRACT SYMPTOMS, SYMPTOM DETAILS UNSPECIFIED: ICD-10-CM

## 2024-04-11 PROCEDURE — 99309 SBSQ NF CARE MODERATE MDM 30: CPT | Performed by: FAMILY MEDICINE

## 2024-04-11 NOTE — LETTER
Patient: Rylan Garcia  : 1945    Encounter Date: 2024    Resident seen 24 -- MP    CC: LTC (Nat) Recheck    : 1945  SNF H&P done 3/23/23  Allergy: Aricept  DNR-CCA    S: 78 yo man with dementia, hypothyroidism, HTN, recent CHI, SHD s/p drain and pAFIB in RVR. No CP/SOB. Med List & Problem list reviewed.    O: VSS AFEB 481# (down 3#) Awake, pleasantly confused, NAD. Chest cta. heart RRR. Left hemiplegia.    LAB (2023) Alb 3.9, Hgb 13.9, Cr 0.99, TSH 1.59, Na 142, K 4.4    A/P:  # Xerotic eczema: Hydrocortisone ointment and prn benadryl.  # Hyperkeratosis: prn lachydrin.  # UE weakness: LTC. completed part B PT.  # Dysphagia: Mech soft/nectar.  # Gait Instability/Falls/CHI/Left hemiplegia d/t SDH: fall precautions. Completed SNF PT/OT. Last saw Dr. Ramos (NS) by telehealth 23. CT Head 2023 stable Right SDH.  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  # BPH w LUTS: alfuzosin 10, finasteride 5.  # MDD: tolerated GDR sertraline to 25 mg  # Hypothyroidism: Euthyroid 2023 on 25 mcg LT4  # HTN: stable on alpha blocker. No falls since 2024.  # PACs: NO pAFIB per EP [facility] 10/27/23.  # Left ureteral stone 8x11mm with hydronephrosis per CT 23: patient asymptomatic with stable Creatinine.      Electronically Signed By: David Hong MD   24  9:11 PM

## 2024-04-11 NOTE — PROGRESS NOTES
Resident seen 24 -- MP    CC: LT (Nat) Recheck    : 1945  SNF H&P done 3/23/23  Allergy: Aricept  DNR-CCA    S: 78 yo man with dementia, hypothyroidism, HTN, recent CHI, SHD s/p drain and pAFIB in RVR. No CP/SOB. Med List & Problem list reviewed.    O: VSS AFEB 481# (down 3#) Awake, pleasantly confused, NAD. Chest cta. heart RRR. Left hemiplegia.    LAB (2023) Alb 3.9, Hgb 13.9, Cr 0.99, TSH 1.59, Na 142, K 4.4    A/P:  # Xerotic eczema: Hydrocortisone ointment and prn benadryl.  # Hyperkeratosis: prn lachydrin.  # UE weakness: LTC. completed part B PT.  # Dysphagia: Mech soft/nectar.  # Gait Instability/Falls/CHI/Left hemiplegia d/t SDH: fall precautions. Completed SNF PT/OT. Last saw Dr. Ramos (NS) by telehealth 23. CT Head 2023 stable Right SDH.  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  # BPH w LUTS: alfuzosin 10, finasteride 5.  # MDD: tolerated GDR sertraline to 25 mg  # Hypothyroidism: Euthyroid 2023 on 25 mcg LT4  # HTN: stable on alpha blocker. No falls since 2024.  # PACs: NO pAFIB per EP [facility] 10/27/23.  # Left ureteral stone 8x11mm with hydronephrosis per CT 23: patient asymptomatic with stable Creatinine.

## 2024-05-09 ENCOUNTER — NURSING HOME VISIT (OUTPATIENT)
Dept: POST ACUTE CARE | Facility: EXTERNAL LOCATION | Age: 79
End: 2024-05-09
Payer: MEDICARE

## 2024-05-09 DIAGNOSIS — M19.90 OSTEOARTHRITIS, UNSPECIFIED OSTEOARTHRITIS TYPE, UNSPECIFIED SITE: ICD-10-CM

## 2024-05-09 DIAGNOSIS — R13.10 DYSPHAGIA, UNSPECIFIED TYPE: ICD-10-CM

## 2024-05-09 PROCEDURE — 99308 SBSQ NF CARE LOW MDM 20: CPT | Performed by: FAMILY MEDICINE

## 2024-05-09 NOTE — LETTER
Patient: Rylan Garcia  : 1945    Encounter Date: 2024    Resident seen 24 -- MP    CC: LTC (Nat) Recheck    : 1945  SNF H&P done 3/23/23  Allergy: Aricept  DNR-CCA    S: 78 yo man with dementia, hypothyroidism, HTN, recent CHI, SHD s/p drain and pAFIB in RVR. No CP/SOB. Med List & Problem list reviewed.    O: VSS AFEB 489# (up 8#) Awake, pleasantly confused, NAD. Chest cta. heart RRR. Left hemiplegia.    LAB (2023) Alb 3.9, Hgb 13.9, Cr 0.99, TSH 1.59, Na 142, K 4.4    A/P:  # Xerotic eczema: Hydrocortisone ointment and prn benadryl.  # Hyperkeratosis: prn lachydrin.  # UE weakness: LTC. completed part B PT.  # Dysphagia: Mech soft/nectar.  # Gait Instability/Falls/CHI/Left hemiplegia d/t SDH: fall precautions. Completed SNF PT/OT. Last saw Dr. Ramos (NS) by telehealth 23. CT Head 2023 stable Right SDH.  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  # BPH w LUTS: alfuzosin 10, finasteride 5.  # MDD: tolerated GDR sertraline to 25 mg  # Hypothyroidism: Euthyroid 2023 on 25 mcg LT4  # HTN: stable on alpha blocker. No falls since 2024.  # PACs: NO pAFIB per EP [place] 10/27/23.  # Left ureteral stone 8x11mm with hydronephrosis per CT 23: patient asymptomatic with stable Creatinine.      Electronically Signed By: David Hong MD   24  1:45 PM

## 2024-05-15 NOTE — PROGRESS NOTES
Resident seen 24 -- MP    CC: LT (Nat) Recheck    : 1945  SNF H&P done 3/23/23  Allergy: Aricept  DNR-CCA    S: 78 yo man with dementia, hypothyroidism, HTN, recent CHI, SHD s/p drain and pAFIB in RVR. No CP/SOB. Med List & Problem list reviewed.    O: VSS AFEB 489# (up 8#) Awake, pleasantly confused, NAD. Chest cta. heart RRR. Left hemiplegia.    LAB (2023) Alb 3.9, Hgb 13.9, Cr 0.99, TSH 1.59, Na 142, K 4.4    A/P:  # Xerotic eczema: Hydrocortisone ointment and prn benadryl.  # Hyperkeratosis: prn lachydrin.  # UE weakness: LTC. completed part B PT.  # Dysphagia: Mech soft/nectar.  # Gait Instability/Falls/CHI/Left hemiplegia d/t SDH: fall precautions. Completed SNF PT/OT. Last saw Dr. Ramos (NS) by telehealth 23. CT Head 2023 stable Right SDH.  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  # BPH w LUTS: alfuzosin 10, finasteride 5.  # MDD: tolerated GDR sertraline to 25 mg  # Hypothyroidism: Euthyroid 2023 on 25 mcg LT4  # HTN: stable on alpha blocker. No falls since 2024.  # PACs: NO pAFIB per EP [place] 10/27/23.  # Left ureteral stone 8x11mm with hydronephrosis per CT 23: patient asymptomatic with stable Creatinine.

## 2024-06-06 ENCOUNTER — NURSING HOME VISIT (OUTPATIENT)
Dept: POST ACUTE CARE | Facility: EXTERNAL LOCATION | Age: 79
End: 2024-06-06
Payer: MEDICARE

## 2024-06-06 DIAGNOSIS — M15.9 GENERALIZED OSTEOARTHRITIS OF MULTIPLE SITES: ICD-10-CM

## 2024-06-06 DIAGNOSIS — E78.5 HYPERLIPIDEMIA, UNSPECIFIED HYPERLIPIDEMIA TYPE: ICD-10-CM

## 2024-06-06 DIAGNOSIS — F32.A DEPRESSION, UNSPECIFIED DEPRESSION TYPE: ICD-10-CM

## 2024-06-06 DIAGNOSIS — E03.9 HYPOTHYROIDISM, UNSPECIFIED TYPE: ICD-10-CM

## 2024-06-06 DIAGNOSIS — N40.0 BENIGN PROSTATIC HYPERPLASIA, UNSPECIFIED WHETHER LOWER URINARY TRACT SYMPTOMS PRESENT: ICD-10-CM

## 2024-06-06 DIAGNOSIS — F03.90 DEMENTIA, UNSPECIFIED DEMENTIA SEVERITY, UNSPECIFIED DEMENTIA TYPE, UNSPECIFIED WHETHER BEHAVIORAL, PSYCHOTIC, OR MOOD DISTURBANCE OR ANXIETY (MULTI): ICD-10-CM

## 2024-06-06 DIAGNOSIS — I10 BENIGN HYPERTENSION: ICD-10-CM

## 2024-06-06 DIAGNOSIS — K59.00 CONSTIPATION, UNSPECIFIED CONSTIPATION TYPE: ICD-10-CM

## 2024-06-06 DIAGNOSIS — E56.9 VITAMIN DEFICIENCY: ICD-10-CM

## 2024-06-06 DIAGNOSIS — S06.5XAA SDH (SUBDURAL HEMATOMA) (MULTI): ICD-10-CM

## 2024-06-06 DIAGNOSIS — I48.91 ATRIAL FIBRILLATION, UNSPECIFIED TYPE (MULTI): ICD-10-CM

## 2024-06-06 DIAGNOSIS — U07.1 COVID: Primary | ICD-10-CM

## 2024-06-06 DIAGNOSIS — G47.00 INSOMNIA, UNSPECIFIED TYPE: ICD-10-CM

## 2024-06-06 PROCEDURE — 99309 SBSQ NF CARE MODERATE MDM 30: CPT | Performed by: NURSE PRACTITIONER

## 2024-06-10 NOTE — PROGRESS NOTES
*Provider Impression*    Patient is a 79 year old male who is seen today for management of multiple medical problems  #COVID - isolation, supportive care, asymptomatic  #SDH / OA - s/p R craniotomy; helmet, acetaminophen 650mg TID and q4h PRN, glucosamine chondroitin MSM daily, lidocaine 4% patch to R shoulder  #HTN / HLD / A-fib - CoQ10 100mg daily, f/u w/ EP on 4/26, omega 3 650mg QOD  #BPH - alfluzosin ER 10mg daily, finasteride 5mg daily  #Hypothyroidism - levothyroxine 25mcg daily  #Constipation - colace 100mg daily PRN, senna 17.2mg daily PRN  #Dermatitis - nystatin triamcinolone cream BID, hydrocortisone 2.5% q8h PRN  #Depression / Insomnia / Dementia - mgmt per psych - sertraline, hydroxyzine, melatonin, oxcarbazepine  #Vitamin deficiency - vitamin B12 2500mcg daily, multivitamin daily  #ACP - DNRCC-A  Follow up as needed      *Chief Complaint*     COVID    *History of Present Illness*    Patient is a 78 y/o male LTC resident of [facility] w/ PMH as below who is seen today for f/u and management of multiple medical problems.    Acetaminophen increased, nystatin added, COVID + 6/5.    He is seen sitting up in his room today and reports no cough, fc sweats CP, SOB, rhinitis, n/v/d, edema or any other c/o presently.    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, dementia, hypothyroidism, frequent falls, SDH  PSH - hernia repair, cataract extraction  FH - Father had heart disease, dementia, mother had HTN  SocHx -  former smoker, No EtOH    *Review of Systems*  All other systems reviewed are negative except as noted in the HPI     *Vital Siins*   Date: 6/6/24  - T: 96.9  P:   R:   BP:   SpO2: 96% on RA ; Date: 6/6/24  Wt: 151.0    *Results / Data*  CBC - Date: 5/8/24  WBC: 5.37  HGB: 13.2  HCT: 38.9  PLT: 229  ;   BMP - Date: 5/8/24  Na: 139  K: 4.1  Cl: 106  CO2: 25  BUN: 13  Cr: 0.80  Glu: 84  Ca: 8.5  ;   LFT - Date: 5/8/24  AST: 20  ALT: 17  ALP: 94   Tbili: 0.3  ;   Coags - Date:   INR:   PT:  Other - Date: 3/29/23 - TSH: 1.100  T4: 5.8 ; 5/15/23 - TSH: 1.530; 5/8/24  TSH: 1.960    *Physical Exam*  Gen: (+) NAD, (+) well-appearing  HEENT: (+) normocephalic, (+) MMM  Neck: (+) supple  Lungs:  (+) rhonchi  Abd: (+) ND  Ext: (+) edema, (-) deformity  MSK: (-) joint swelling  Skin:  (-) rash  Neuro: (+) follows commands, (-) tremor, (+) alert

## 2024-06-13 ENCOUNTER — NURSING HOME VISIT (OUTPATIENT)
Dept: POST ACUTE CARE | Facility: EXTERNAL LOCATION | Age: 79
End: 2024-06-13
Payer: MEDICARE

## 2024-06-13 DIAGNOSIS — E03.9 HYPOTHYROIDISM, ADULT: ICD-10-CM

## 2024-06-13 DIAGNOSIS — U07.1 COVID: ICD-10-CM

## 2024-06-13 DIAGNOSIS — I10 PRIMARY HYPERTENSION: ICD-10-CM

## 2024-06-13 PROCEDURE — 99309 SBSQ NF CARE MODERATE MDM 30: CPT | Performed by: FAMILY MEDICINE

## 2024-06-13 NOTE — PROGRESS NOTES
Resident seen 24 -- MP    CC: LT (Nat) Recheck    : 1945  SNF H&P done 3/23/23  Allergy: Aricept  DNR-CCA    S: 80 yo man with dementia, hypothyroidism, HTN, recent CHI, SHD s/p drain and pAFIB in RVR. +COVID 24 -- asymptomatic screen during community outbreak. No CP/SOB. Med List & Problem list reviewed.    O: VSS AFEB 151# (24) Awake, pleasantly confused, NAD. Chest cta. heart RRR. Left hemiplegia.    LAB (24) Alb 3.5, Hgb 13.2, Cr 0.8, TSH 1.96, T4 4.8 Na 139, K 4.1    A/P:  # Xerotic eczema: Hydrocortisone ointment and prn benadryl.  # Hyperkeratosis: prn lachydrin.  # UE weakness: LTC. completed part B PT.  # Dysphagia: Mech soft/nectar.  # Gait Instability/Falls/CHI/Left hemiplegia d/t SDH: fall precautions. Completed SNF PT/OT. Last saw Dr. Ramos (NS) by telehealth 23. CT Head 2023 stable Right SDH.  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  # BPH w LUTS: alfuzosin 10, finasteride 5.  # MDD: tolerated GDR sertraline to 25 mg  # Hypothyroidism: Euthyroid 2024 on 25 mcg LT4  # HTN: stable on alpha blocker. No falls since 2024.  # PACs: NO pAFIB per EP [place] 10/27/23.  # Left ureteral stone 8x11mm with hydronephrosis per CT 23: patient asymptomatic with stable Creatinine.  # Asymptomatic COVID: isolation -6/15/24.

## 2024-06-13 NOTE — LETTER
Patient: Rylan Garcia  : 1945    Encounter Date: 2024    Resident seen 24 -- MP    CC: LTC (Nat) Recheck    : 1945  SNF H&P done 3/23/23  Allergy: Aricept  DNR-CCA    S: 78 yo man with dementia, hypothyroidism, HTN, recent CHI, SHD s/p drain and pAFIB in RVR. +COVID 24 -- asymptomatic screen during community outbreak. No CP/SOB. Med List & Problem list reviewed.    O: VSS AFEB 151# (24) Awake, pleasantly confused, NAD. Chest cta. heart RRR. Left hemiplegia.    LAB (24) Alb 3.5, Hgb 13.2, Cr 0.8, TSH 1.96, T4 4.8 Na 139, K 4.1    A/P:  # Xerotic eczema: Hydrocortisone ointment and prn benadryl.  # Hyperkeratosis: prn lachydrin.  # UE weakness: LTC. completed part B PT.  # Dysphagia: [facility] [place] soft/nectar.  # Gait Instability/Falls/CHI/Left hemiplegia d/t SDH: fall precautions. Completed SNF PT/OT. Last saw Dr. Ramos (NS) by telehealth 23. CT Head 2023 stable Right SDH.  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  # BPH w LUTS: alfuzosin 10, finasteride 5.  # MDD: tolerated GDR sertraline to 25 mg  # Hypothyroidism: Euthyroid 2024 on 25 mcg LT4  # HTN: stable on alpha blocker. No falls since 2024.  # PACs: NO pAFIB per EP [place] 10/27/23.  # Left ureteral stone 8x11mm with hydronephrosis per CT 23: patient asymptomatic with stable Creatinine.  # Asymptomatic COVID: isolation -6/15/24.      Electronically Signed By: Dvaid Hong MD   24  8:54 PM

## 2024-06-25 ENCOUNTER — OFFICE VISIT (OUTPATIENT)
Dept: NEUROLOGY | Facility: CLINIC | Age: 79
End: 2024-06-25
Payer: MEDICARE

## 2024-06-25 DIAGNOSIS — Z00.6 RESEARCH EXAM: Primary | ICD-10-CM

## 2024-06-25 PROCEDURE — 99214 OFFICE O/P EST MOD 30 MIN: CPT | Mod: 95 | Performed by: PSYCHIATRY & NEUROLOGY

## 2024-06-25 PROCEDURE — 99214 OFFICE O/P EST MOD 30 MIN: CPT | Performed by: PSYCHIATRY & NEUROLOGY

## 2024-06-25 PROCEDURE — 1157F ADVNC CARE PLAN IN RCRD: CPT | Performed by: PSYCHIATRY & NEUROLOGY

## 2024-06-25 NOTE — PROGRESS NOTES
Patient seen as part of the [facility] study.  All activities of the visit are contained in the study's source documents.    Virtual or Telephone Consent    An interactive audio and video telecommunication system which permits real time communications between the patient (at the originating site) and provider (at the distant site) was utilized to provide this telehealth service.   Verbal consent was requested and obtained from Mrs. Garcia on this date, 06/25/24 for a telehealth visit.

## 2024-06-28 ENCOUNTER — APPOINTMENT (OUTPATIENT)
Dept: NEUROLOGY | Facility: CLINIC | Age: 79
End: 2024-06-28
Payer: MEDICARE

## 2024-07-18 ENCOUNTER — NURSING HOME VISIT (OUTPATIENT)
Dept: POST ACUTE CARE | Facility: EXTERNAL LOCATION | Age: 79
End: 2024-07-18
Payer: MEDICARE

## 2024-07-18 DIAGNOSIS — N40.1 BENIGN PROSTATIC HYPERPLASIA WITH LOWER URINARY TRACT SYMPTOMS, SYMPTOM DETAILS UNSPECIFIED: ICD-10-CM

## 2024-07-18 DIAGNOSIS — R13.10 DYSPHAGIA, UNSPECIFIED TYPE: ICD-10-CM

## 2024-07-18 NOTE — LETTER
Patient: Rylan Garcia  : 1945    Encounter Date: 2024    Resident seen 24 -- MP    CC: LTC (Nat) Recheck    : 1945  SNF H&P done 3/23/23  Allergy: Aricept  DNR-CCA    S: 78 yo man with dementia, hypothyroidism, HTN, recent CHI, SHD s/p drain and pAFIB in RVR. Itching controlled with bendadryl. No CP/SOB. Med List & Problem list reviewed.    O: VSS AFEB 146# (down 5#) Awake, pleasantly confused, NAD. Chest cta. heart RRR. Left hemiplegia.    LAB (24) Alb 3.5, Hgb 13.2, Cr 0.8, TSH 1.96, T4 4.8 Na 139, K 4.1    A/P:  # Xerotic eczema: Hydrocortisone ointment and prn benadryl.  # Hyperkeratosis: prn lachydrin.  # UE weakness: LTC. completed part B PT.  # [facility]  # Gait Instability/Falls/CHI/Left hemiplegia d/t SDH: fall precautions. Completed SNF PT/OT. Last saw Dr. Ramos (NS) by telehealth 23. CT Head 2023 stable Right SDH.  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  # BPH w LUTS: alfuzosin 10, finasteride 5.  # MDD: tolerated GDR sertraline to 25 mg  # Hypothyroidism: Euthyroid 2024 on 25 mcg LT4  # HTN: stable on alpha blocker. No falls since 2024.  # PACs: NO pAFIB per EP [place] 10/27/23.  # Left ureteral stone 8x11mm with hydronephrosis per CT 23: patient asymptomatic with stable Creatinine.  # Asymptomatic COVID: isolation -6/15/24.      Electronically Signed By: David Hong MD   24  7:07 PM

## 2024-07-24 NOTE — PROGRESS NOTES
Resident seen 24 -- MP    CC: LT (Nat) Recheck    : 1945  SNF H&P done 3/23/23  Allergy: Aricept  DNR-CCA    S: 80 yo man with dementia, hypothyroidism, HTN, recent CHI, SHD s/p drain and pAFIB in RVR. Itching controlled with bendadryl. No CP/SOB. Med List & Problem list reviewed.    O: VSS AFEB 146# (down 5#) Awake, pleasantly confused, NAD. Chest cta. heart RRR. Left hemiplegia.    LAB (24) Alb 3.5, Hgb 13.2, Cr 0.8, TSH 1.96, T4 4.8 Na 139, K 4.1    A/P:  # Xerotic eczema: Hydrocortisone ointment and prn benadryl.  # Hyperkeratosis: prn lachydrin.  # UE weakness: LTC. completed part B PT.  # Dysphagia: Mech soft/nectar.  # Gait Instability/Falls/CHI/Left hemiplegia d/t SDH: fall precautions. Completed SNF PT/OT. Last saw Dr. Ramos (NS) by telehealth 23. CT Head 2023 stable Right SDH.  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  # BPH w LUTS: alfuzosin 10, finasteride 5.  # MDD: tolerated GDR sertraline to 25 mg  # Hypothyroidism: Euthyroid 2024 on 25 mcg LT4  # HTN: stable on alpha blocker. No falls since 2024.  # PACs: NO pAFIB per EP [place] 10/27/23.  # Left ureteral stone 8x11mm with hydronephrosis per CT 23: patient asymptomatic with stable Creatinine.  # Asymptomatic COVID: isolation -6/15/24.

## 2024-08-08 ENCOUNTER — NURSING HOME VISIT (OUTPATIENT)
Dept: POST ACUTE CARE | Facility: EXTERNAL LOCATION | Age: 79
End: 2024-08-08
Payer: MEDICARE

## 2024-08-08 DIAGNOSIS — R13.10 DYSPHAGIA, UNSPECIFIED TYPE: ICD-10-CM

## 2024-08-08 DIAGNOSIS — N40.1 BENIGN PROSTATIC HYPERPLASIA WITH LOWER URINARY TRACT SYMPTOMS, SYMPTOM DETAILS UNSPECIFIED: ICD-10-CM

## 2024-08-08 NOTE — LETTER
Patient: Rylan Garcia  : 1945    Encounter Date: 2024    Resident seen 24 -- MP    CC: LTC (Nat) Recheck    : 1945  SNF H&P done 3/23/23  Allergy: Aricept  DNR-CCA    S: 78 yo man with dementia, hypothyroidism, HTN, recent CHI, SHD s/p drain. Itching controlled with bendadryl. No CP/SOB. Med List & Problem list reviewed.    O: VSS AFEB 149# (up 3#) Awake, pleasantly confused, NAD. Chest cta. heart RRR. Left hemiplegia.    LAB (24) Alb 3.5, Hgb 13.2, Cr 0.8, TSH 1.96, T4 4.8 Na 139, K 4.1    A/P:  # Xerotic eczema: Hydrocortisone ointment and prn benadryl.  # Hyperkeratosis: prn lachydrin.  # UE weakness: LTC. completed part B PT.  # [facility]  # Gait Instability/Falls/CHI/Left hemiplegia d/t SDH: fall precautions. Completed SNF PT/OT. Last saw Dr. Ramos (NS) by telehealth 23. CT Head 2023 stable Right SDH.  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  # BPH w LUTS: alfuzosin 10, finasteride 5.  # MDD: tolerated GDR sertraline to 25 mg  # Hypothyroidism: Euthyroid 2024 on 25 mcg LT4  # HTN: stable on alpha blocker. No falls since 2024.  # PACs: NO pAFIB per EP [place] 10/27/23.  # Left ureteral stone 8x11mm with hydronephrosis per CT 23: patient asymptomatic with stable Creatinine.  # Asymptomatic COVID: isolation -6/15/24.      Electronically Signed By: David Hong MD   24  9:10 PM

## 2024-08-12 NOTE — PROGRESS NOTES
Resident seen 24 -- MP    CC: LT (Nat) Recheck    : 1945  SNF H&P done 3/23/23  Allergy: Aricept  DNR-CCA    S: 80 yo man with dementia, hypothyroidism, HTN, recent CHI, SHD s/p drain. Itching controlled with bendadryl. No CP/SOB. Med List & Problem list reviewed.    O: VSS AFEB 149# (up 3#) Awake, pleasantly confused, NAD. Chest cta. heart RRR. Left hemiplegia.    LAB (24) Alb 3.5, Hgb 13.2, Cr 0.8, TSH 1.96, T4 4.8 Na 139, K 4.1    A/P:  # Xerotic eczema: Hydrocortisone ointment and prn benadryl.  # Hyperkeratosis: prn lachydrin.  # UE weakness: LTC. completed part B PT.  # Dysphagia: Mech soft/nectar.  # Gait Instability/Falls/CHI/Left hemiplegia d/t SDH: fall precautions. Completed SNF PT/OT. Last saw Dr. Ramos (NS) by telehealth 23. CT Head 2023 stable Right SDH.  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  # BPH w LUTS: alfuzosin 10, finasteride 5.  # MDD: tolerated GDR sertraline to 25 mg  # Hypothyroidism: Euthyroid 2024 on 25 mcg LT4  # HTN: stable on alpha blocker. No falls since 2024.  # PACs: NO pAFIB per EP [place] 10/27/23.  # Left ureteral stone 8x11mm with hydronephrosis per CT 23: patient asymptomatic with stable Creatinine.  # Asymptomatic COVID: isolation -6/15/24.

## 2024-09-12 ENCOUNTER — NURSING HOME VISIT (OUTPATIENT)
Dept: POST ACUTE CARE | Facility: EXTERNAL LOCATION | Age: 79
End: 2024-09-12
Payer: MEDICARE

## 2024-09-12 DIAGNOSIS — R13.10 DYSPHAGIA, UNSPECIFIED TYPE: ICD-10-CM

## 2024-09-12 DIAGNOSIS — F02.80 DEMENTIA IN CORTICOBASAL DEGENERATION (MULTI): ICD-10-CM

## 2024-09-12 DIAGNOSIS — G31.85 DEMENTIA IN CORTICOBASAL DEGENERATION (MULTI): ICD-10-CM

## 2024-09-12 PROCEDURE — 99309 SBSQ NF CARE MODERATE MDM 30: CPT | Performed by: FAMILY MEDICINE

## 2024-09-12 NOTE — LETTER
Patient: Rylan Garcia  : 1945    Encounter Date: 2024    Resident seen 24 -- MP    CC: LTC (Nat) Recheck    : 1945  SNF H&P done 3/23/23  Allergy: Aricept  DNR-CCA    S: 78 yo man with dementia, hypothyroidism, HTN, recent CHI, SHD s/p drain. Interval falls without injury. No CP/SOB. Med List & Problem list reviewed.    O: VSS AFEB 150# (up 1#) Awake, pleasantly confused, NAD. Chest cta. heart RRR. Left hemiplegia.    LAB (24) Alb 3.5, Hgb 13.2, Cr 0.8, TSH 1.96, T4 4.8 Na 139, K 4.1    A/P:  # Xerotic eczema: Controlled with HC ointment and prn benadryl.  # Hyperkeratosis: prn lachydrin.  # UE weakness: LTC. completed part B PT.  # [facility]  # Gait Instability/Falls/CHI/Left hemiplegia d/t SDH: fall precautions. Completed SNF PT/OT. Last saw Dr. Ramos (NS) by telehealth 23. CT Head 2023 stable Right SDH.  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  # BPH w LUTS: alfuzosin 10, finasteride 5.  # MDD: tolerated GDR sertraline to 25 mg  # Hypothyroidism: Euthyroid 2024 on 25 mcg LT4  # HTN: stable on alpha blocker. No falls since 2024.  # PACs: NO pAFIB per EP [place] 10/27/23.  # Left ureteral stone 8x11mm with hydronephrosis per CT 23: patient asymptomatic with stable Creatinine.  # Asymptomatic COVID: isolation -6/15/24.      Electronically Signed By: David Hong MD   24  3:35 PM

## 2024-09-17 NOTE — PROGRESS NOTES
Resident seen 24 -- MP    CC: LT (Nat) Recheck    : 1945  SNF H&P done 3/23/23  Allergy: Aricept  DNR-CCA    S: 80 yo man with dementia, hypothyroidism, HTN, recent CHI, SHD s/p drain. Interval falls without injury. No CP/SOB. Med List & Problem list reviewed.    O: VSS AFEB 150# (up 1#) Awake, pleasantly confused, NAD. Chest cta. heart RRR. Left hemiplegia.    LAB (24) Alb 3.5, Hgb 13.2, Cr 0.8, TSH 1.96, T4 4.8 Na 139, K 4.1    A/P:  # Xerotic eczema: Controlled with HC ointment and prn benadryl.  # Hyperkeratosis: prn lachydrin.  # UE weakness: LTC. completed part B PT.  # Dysphagia: Mech soft/nectar.  # Gait Instability/Falls/CHI/Left hemiplegia d/t SDH: fall precautions. Completed SNF PT/OT. Last saw Dr. Ramos (NS) by telehealth 23. CT Head 2023 stable Right SDH.  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  # BPH w LUTS: alfuzosin 10, finasteride 5.  # MDD: tolerated GDR sertraline to 25 mg  # Hypothyroidism: Euthyroid 2024 on 25 mcg LT4  # HTN: stable on alpha blocker. No falls since 2024.  # PACs: NO pAFIB per EP [place] 10/27/23.  # Left ureteral stone 8x11mm with hydronephrosis per CT 23: patient asymptomatic with stable Creatinine.  # Asymptomatic COVID: isolation -6/15/24.

## 2024-10-10 ENCOUNTER — NURSING HOME VISIT (OUTPATIENT)
Dept: POST ACUTE CARE | Facility: EXTERNAL LOCATION | Age: 79
End: 2024-10-10
Payer: MEDICARE

## 2024-10-10 DIAGNOSIS — N40.1 BENIGN PROSTATIC HYPERPLASIA WITH LOWER URINARY TRACT SYMPTOMS, SYMPTOM DETAILS UNSPECIFIED: ICD-10-CM

## 2024-10-10 DIAGNOSIS — R13.10 DYSPHAGIA, UNSPECIFIED TYPE: ICD-10-CM

## 2024-10-10 PROCEDURE — 99308 SBSQ NF CARE LOW MDM 20: CPT | Performed by: FAMILY MEDICINE

## 2024-10-10 NOTE — LETTER
Patient: Rylan Garcia  : 1945    Encounter Date: 10/10/2024    Resident seen 10/10/24 -- MP    CC: LTC (Nat) Recheck    : 1945  SNF H&P done 3/23/23  Allergy: Aricept  DNR-CCA    S: 78 yo man with dementia, hypothyroidism, HTN, recent CHI, SHD s/p drain. Interval falls without injury. No CP/SOB. Med List & Problem list reviewed.    O: VSS AFEB 146# (down 4#) Awake, pleasantly confused, NAD. Chest cta. heart RRR. Left hemiplegia.    LAB (24) Alb 3.5, Hgb 13.2, Cr 0.8, TSH 1.96, T4 4.8 Na 139, K 4.1    A/P:  # Xerotic eczema: Controlled with HC ointment and prn benadryl.  # Hyperkeratosis: prn lachydrin.  # UE weakness: LTC. completed part B PT.  # [facility]  # Gait Instability/Falls/CHI/Left hemiplegia d/t SDH: fall precautions. Completed SNF PT/OT. Last saw Dr. Ramos (NS) by telehealth 23. CT Head 2023 stable Right SDH.  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  # BPH w LUTS: alfuzosin 10, finasteride 5.  # MDD: tolerated GDR sertraline to 25 mg  # Hypothyroidism: Euthyroid 2024 on 25 mcg LT4  # HTN: stable on alpha blocker. No falls since 2024.  # PACs: NO pAFIB per EP [place] 10/27/23.  # Left ureteral stone 8x11mm with hydronephrosis per CT 23: patient asymptomatic with stable Creatinine.  # Asymptomatic COVID: isolation -6/15/24.      Electronically Signed By: David Hong MD   10/29/24  9:35 PM

## 2024-10-21 NOTE — PROGRESS NOTES
Resident seen 10/10/24 -- MP    CC: LT (Nat) Recheck    : 1945  SNF H&P done 3/23/23  Allergy: Aricept  DNR-CCA    S: 78 yo man with dementia, hypothyroidism, HTN, recent CHI, SHD s/p drain. Interval falls without injury. No CP/SOB. Med List & Problem list reviewed.    O: VSS AFEB 146# (down 4#) Awake, pleasantly confused, NAD. Chest cta. heart RRR. Left hemiplegia.    LAB (24) Alb 3.5, Hgb 13.2, Cr 0.8, TSH 1.96, T4 4.8 Na 139, K 4.1    A/P:  # Xerotic eczema: Controlled with HC ointment and prn benadryl.  # Hyperkeratosis: prn lachydrin.  # UE weakness: LTC. completed part B PT.  # Dysphagia: Mech soft/nectar.  # Gait Instability/Falls/CHI/Left hemiplegia d/t SDH: fall precautions. Completed SNF PT/OT. Last saw Dr. Ramos (NS) by telehealth 23. CT Head 2023 stable Right SDH.  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  # BPH w LUTS: alfuzosin 10, finasteride 5.  # MDD: tolerated GDR sertraline to 25 mg  # Hypothyroidism: Euthyroid 2024 on 25 mcg LT4  # HTN: stable on alpha blocker. No falls since 2024.  # PACs: NO pAFIB per EP [place] 10/27/23.  # Left ureteral stone 8x11mm with hydronephrosis per CT 23: patient asymptomatic with stable Creatinine.  # Asymptomatic COVID: isolation -6/15/24.

## 2024-11-14 ENCOUNTER — NURSING HOME VISIT (OUTPATIENT)
Dept: POST ACUTE CARE | Facility: EXTERNAL LOCATION | Age: 79
End: 2024-11-14
Payer: MEDICARE

## 2024-11-14 DIAGNOSIS — N40.1 BENIGN PROSTATIC HYPERPLASIA WITH LOWER URINARY TRACT SYMPTOMS, SYMPTOM DETAILS UNSPECIFIED: ICD-10-CM

## 2024-11-14 DIAGNOSIS — R13.10 DYSPHAGIA, UNSPECIFIED TYPE: ICD-10-CM

## 2024-11-14 PROCEDURE — 99308 SBSQ NF CARE LOW MDM 20: CPT | Performed by: FAMILY MEDICINE

## 2024-11-14 NOTE — LETTER
Patient: Rylan Garcia  : 1945    Encounter Date: 2024    Resident seen 24 -- MP    CC: LTC (Nat) Recheck    : 1945  SNF H&P done 3/23/23  Allergy: Aricept  DNR-CCA    S: 78 yo man with dementia, hypothyroidism, HTN, recent CHI, SHD s/p drain. Interval fall without injury. No CP/SOB. Med List & Problem list reviewed.    O: VSS AFEB 150# (up 4#) Awake, pleasantly confused, NAD. Chest cta. heart RRR. Left hemiplegia.    LAB (24) Alb 3.5, Hgb 13.2, Cr 0.8, TSH 1.96, T4 4.8 Na 139, K 4.1    A/P:  # Xerotic eczema: Controlled with HC ointment and prn benadryl.  # Hyperkeratosis: prn lachydrin.  # UE weakness: LTC. completed part B PT.  # [facility]  # Gait Instability/Falls/CHI/Left hemiplegia d/t SDH: fall precautions. Completed SNF PT/OT. Last saw Dr. Ramos (NS) by telehealth 23. CT Head 2023 stable Right SDH.  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  # BPH w LUTS: alfuzosin 10, finasteride 5.  # MDD: tolerated GDR sertraline to 25 mg  # Hypothyroidism: Euthyroid 2024 on 25 mcg LT4  # HTN: stable on alpha blocker. No falls since 2024.  # PACs: NO pAFIB per EP [place] 10/27/23.  # Left ureteral stone 8x11mm with hydronephrosis per CT 23: patient asymptomatic with stable Creatinine.  # Asymptomatic COVID: isolation -6/15/24.      Electronically Signed By: David Hong MD   24  3:42 PM

## 2024-11-18 NOTE — PROGRESS NOTES
Resident seen 24 -- MP    CC: LT (Nat) Recheck    : 1945  SNF H&P done 3/23/23  Allergy: Aricept  DNR-CCA    S: 78 yo man with dementia, hypothyroidism, HTN, recent CHI, SHD s/p drain. Interval fall without injury. No CP/SOB. Med List & Problem list reviewed.    O: VSS AFEB 150# (up 4#) Awake, pleasantly confused, NAD. Chest cta. heart RRR. Left hemiplegia.    LAB (24) Alb 3.5, Hgb 13.2, Cr 0.8, TSH 1.96, T4 4.8 Na 139, K 4.1    A/P:  # Xerotic eczema: Controlled with HC ointment and prn benadryl.  # Hyperkeratosis: prn lachydrin.  # UE weakness: LTC. completed part B PT.  # Dysphagia: Mech soft/nectar.  # Gait Instability/Falls/CHI/Left hemiplegia d/t SDH: fall precautions. Completed SNF PT/OT. Last saw Dr. Ramos (NS) by telehealth 23. CT Head 2023 stable Right SDH.  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  # BPH w LUTS: alfuzosin 10, finasteride 5.  # MDD: tolerated GDR sertraline to 25 mg  # Hypothyroidism: Euthyroid 2024 on 25 mcg LT4  # HTN: stable on alpha blocker. No falls since 2024.  # PACs: NO pAFIB per EP [place] 10/27/23.  # Left ureteral stone 8x11mm with hydronephrosis per CT 23: patient asymptomatic with stable Creatinine.  # Asymptomatic COVID: isolation -6/15/24.

## 2024-12-12 ENCOUNTER — NURSING HOME VISIT (OUTPATIENT)
Dept: POST ACUTE CARE | Facility: EXTERNAL LOCATION | Age: 79
End: 2024-12-12
Payer: MEDICARE

## 2024-12-12 DIAGNOSIS — R13.10 DYSPHAGIA, UNSPECIFIED TYPE: ICD-10-CM

## 2024-12-12 DIAGNOSIS — N40.1 BENIGN PROSTATIC HYPERPLASIA WITH LOWER URINARY TRACT SYMPTOMS, SYMPTOM DETAILS UNSPECIFIED: ICD-10-CM

## 2024-12-12 PROCEDURE — 99309 SBSQ NF CARE MODERATE MDM 30: CPT | Performed by: FAMILY MEDICINE

## 2024-12-12 NOTE — LETTER
Patient: Rylan Garcia  : 1945    Encounter Date: 2024    Resident seen 24 -- MP    CC: LTC (Nat) Recheck    : 1945  SNF H&P done 3/23/23  Allergy: Aricept  DNR-CCA    S: 80 yo man with dementia, hypothyroidism, HTN, recent CHI, SHD s/p drain. No CP/SOB. Med List & Problem list reviewed.    O: VSS AFEB 157# (up 7#) Awake, pleasantly confused, NAD. Chest cta. heart RRR. Left hemiplegia.    LAB (24) Alb 3.5, Hgb 13.2, Cr 0.8, TSH 1.96, T4 4.8 Na 139, K 4.1    A/P:  # Xerotic eczema: Controlled with HC ointment and prn benadryl.  # Hyperkeratosis: prn lachydrin.  # UE weakness: LTC. completed part B PT.  # Dysphagia: Mech soft/nectar. Gaining weight in assist dining room.  # Gait Instability/Falls/CHI/Left hemiplegia d/t SDH: fall precautions. Completed SNF PT/OT. Last saw Dr. Ramos (NS) by telehealth 23. CT Head 2023 stable Right SDH.  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  # BPH w LUTS: alfuzosin 10, finasteride 5.  # MDD: tolerated GDR sertraline to 25 mg  # Hypothyroidism: Euthyroid 2024 on 25 mcg LT4  # HTN: stable on alpha blocker. No falls since 2024.  # PACs: NO pAFIB per EP [place] 10/27/23.  # Left ureteral stone 8x11mm with hydronephrosis per CT 23: patient asymptomatic with stable Creatinine.  # Asymptomatic COVID: isolation -6/15/24.      Electronically Signed By: David Hong MD   12/15/24  1:57 PM

## 2024-12-12 NOTE — PROGRESS NOTES
Resident seen 24 -- MP    CC: LT (Nat) Recheck    : 1945  SNF H&P done 3/23/23  Allergy: Aricept  DNR-CCA    S: 78 yo man with dementia, hypothyroidism, HTN, recent CHI, SHD s/p drain. No CP/SOB. Med List & Problem list reviewed.    O: VSS AFEB 157# (up 7#) Awake, pleasantly confused, NAD. Chest cta. heart RRR. Left hemiplegia.    LAB (24) Alb 3.5, Hgb 13.2, Cr 0.8, TSH 1.96, T4 4.8 Na 139, K 4.1    A/P:  # Xerotic eczema: Controlled with HC ointment and prn benadryl.  # Hyperkeratosis: prn lachydrin.  # UE weakness: LTC. completed part B PT.  # Dysphagia: Mech soft/nectar. Gaining weight in assist dining room.  # Gait Instability/Falls/CHI/Left hemiplegia d/t SDH: fall precautions. Completed SNF PT/OT. Last saw Dr. Ramos (NS) by telehealth 23. CT Head 2023 stable Right SDH.  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  # BPH w LUTS: alfuzosin 10, finasteride 5.  # MDD: tolerated GDR sertraline to 25 mg  # Hypothyroidism: Euthyroid 2024 on 25 mcg LT4  # HTN: stable on alpha blocker. No falls since 2024.  # PACs: NO pAFIB per EP [place] 10/27/23.  # Left ureteral stone 8x11mm with hydronephrosis per CT 23: patient asymptomatic with stable Creatinine.  # Asymptomatic COVID: isolation -6/15/24.

## 2025-01-09 ENCOUNTER — NURSING HOME VISIT (OUTPATIENT)
Dept: POST ACUTE CARE | Facility: EXTERNAL LOCATION | Age: 80
End: 2025-01-09
Payer: MEDICARE

## 2025-01-09 DIAGNOSIS — I10 PRIMARY HYPERTENSION: ICD-10-CM

## 2025-01-09 DIAGNOSIS — R13.10 DYSPHAGIA, UNSPECIFIED TYPE: ICD-10-CM

## 2025-01-09 NOTE — LETTER
Patient: Rylan Garcia  : 1945    Encounter Date: 2025    Resident seen 24 -- MP    CC: LTC (Nat) Recheck    : 1945  SNF H&P done 3/23/23  Allergy: Aricept  DNR-CCA    S: 78 yo man with dementia, hypothyroidism, HTN, recent CHI, SHD s/p drain. No CP/SOB. Med List & Problem list reviewed.    O: VSS AFEB 154# (down 3#) Awake, pleasantly confused, NAD. Chest cta. heart RRR. Left hemiplegia.    LAB (24) Alb 3.5, Hgb 13.2, Cr 0.8, TSH 1.96, T4 4.8 Na 139, K 4.1    A/P:  # Xerotic eczema: Controlled with HC ointment and prn benadryl.  # Hyperkeratosis: prn lachydrin.  # UE weakness: LTC. completed part B PT.  # Dysphagia: Mech soft/nectar. Gaining weight in assist dining room.  # Gait Instability/Falls/CHI/Left hemiplegia d/t SDH: fall precautions. Completed SNF PT/OT. Last saw Dr. Ramos (NS) by telehealth 23. CT Head 2023 stable Right SDH.  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  # BPH w LUTS: alfuzosin 10, finasteride 5.  # MDD: tolerated GDR sertraline to 25 mg  # Hypothyroidism: Euthyroid 2024 on 25 mcg LT4  # HTN: stable on alpha blocker. No falls since 2024.  # PACs: NO pAFIB per EP [place] 10/27/23.  # Left ureteral stone 8x11mm with hydronephrosis per CT 23: patient asymptomatic with stable Creatinine.  # Asymptomatic COVID: isolation -6/15/24.      Electronically Signed By: David Hong MD   25  5:11 PM

## 2025-01-09 NOTE — PROGRESS NOTES
Resident seen 24 -- MP    CC: LT (Nat) Recheck    : 1945  SNF H&P done 3/23/23  Allergy: Aricept  DNR-CCA    S: 78 yo man with dementia, hypothyroidism, HTN, recent CHI, SHD s/p drain. No CP/SOB. Med List & Problem list reviewed.    O: VSS AFEB 154# (down 3#) Awake, pleasantly confused, NAD. Chest cta. heart RRR. Left hemiplegia.    LAB (24) Alb 3.5, Hgb 13.2, Cr 0.8, TSH 1.96, T4 4.8 Na 139, K 4.1    A/P:  # Xerotic eczema: Controlled with HC ointment and prn benadryl.  # Hyperkeratosis: prn lachydrin.  # UE weakness: LTC. completed part B PT.  # Dysphagia: Mech soft/nectar. Gaining weight in assist dining room.  # Gait Instability/Falls/CHI/Left hemiplegia d/t SDH: fall precautions. Completed SNF PT/OT. Last saw Dr. Ramos (NS) by telehealth 23. CT Head 2023 stable Right SDH.  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  # BPH w LUTS: alfuzosin 10, finasteride 5.  # MDD: tolerated GDR sertraline to 25 mg  # Hypothyroidism: Euthyroid 2024 on 25 mcg LT4  # HTN: stable on alpha blocker. No falls since 2024.  # PACs: NO pAFIB per EP [place] 10/27/23.  # Left ureteral stone 8x11mm with hydronephrosis per CT 23: patient asymptomatic with stable Creatinine.  # Asymptomatic COVID: isolation -6/15/24.

## 2025-01-24 ENCOUNTER — NURSING HOME VISIT (OUTPATIENT)
Dept: POST ACUTE CARE | Facility: EXTERNAL LOCATION | Age: 80
End: 2025-01-24
Payer: MEDICARE

## 2025-01-24 DIAGNOSIS — M62.81 MUSCLE WEAKNESS (GENERALIZED): ICD-10-CM

## 2025-01-24 DIAGNOSIS — M15.9 GENERALIZED OSTEOARTHRITIS OF MULTIPLE SITES: ICD-10-CM

## 2025-01-24 DIAGNOSIS — S06.5XAA SDH (SUBDURAL HEMATOMA) (MULTI): Primary | ICD-10-CM

## 2025-01-24 DIAGNOSIS — K59.00 CONSTIPATION, UNSPECIFIED CONSTIPATION TYPE: ICD-10-CM

## 2025-01-24 DIAGNOSIS — F32.A DEPRESSION, UNSPECIFIED DEPRESSION TYPE: ICD-10-CM

## 2025-01-24 DIAGNOSIS — E03.9 HYPOTHYROIDISM, UNSPECIFIED TYPE: ICD-10-CM

## 2025-01-24 DIAGNOSIS — F03.90 DEMENTIA, UNSPECIFIED DEMENTIA SEVERITY, UNSPECIFIED DEMENTIA TYPE, UNSPECIFIED WHETHER BEHAVIORAL, PSYCHOTIC, OR MOOD DISTURBANCE OR ANXIETY (MULTI): ICD-10-CM

## 2025-01-24 DIAGNOSIS — I10 BENIGN HYPERTENSION: ICD-10-CM

## 2025-01-24 DIAGNOSIS — E03.9 HYPOTHYROIDISM, ADULT: ICD-10-CM

## 2025-01-24 DIAGNOSIS — N40.1 BENIGN PROSTATIC HYPERPLASIA WITH LOWER URINARY TRACT SYMPTOMS, SYMPTOM DETAILS UNSPECIFIED: ICD-10-CM

## 2025-01-24 DIAGNOSIS — E78.5 HYPERLIPIDEMIA, UNSPECIFIED HYPERLIPIDEMIA TYPE: ICD-10-CM

## 2025-01-24 DIAGNOSIS — I48.91 ATRIAL FIBRILLATION, UNSPECIFIED TYPE (MULTI): ICD-10-CM

## 2025-01-24 DIAGNOSIS — G47.00 INSOMNIA, UNSPECIFIED TYPE: ICD-10-CM

## 2025-01-24 PROCEDURE — 99309 SBSQ NF CARE MODERATE MDM 30: CPT | Performed by: NURSE PRACTITIONER

## 2025-01-27 NOTE — PROGRESS NOTES
*Provider Impression*    Patient is a 79 year old male who is seen today for management of multiple medical problems  #Weakness / SDH / OA - s/p R craniotomy; helmet, acetaminophen 650mg TID and q4h PRN, glucosamine chondroitin MSM daily, lidocaine 4% patch to R shoulder, biofreeze 4% BID,  #BPH - alfluzosin ER 10mg daily, finasteride 5mg daily, check UA c&s  #HTN / HLD / A-fib - CoQ10 100mg daily, omega 3 650mg QOD  #Hypothyroidism - levothyroxine 25mcg daily  #Constipation - colace 100mg daily PRN, senna 17.2mg daily PRN  #Dermatitis - nystatin triamcinolone cream BID, hydrocortisone 2.5% q8h PRN  #Depression / Insomnia / Dementia - mgmt per psych - sertraline, hydroxyzine, melatonin, oxcarbazepine  #Vitamin deficiency - vitamin B12 2500mcg daily, multivitamin daily  #ACP - DNRCC-A  Follow up as needed      *Chief Complaint*     weakness    *History of Present Illness*    Patient is a 80 y/o male LTC resident of [facility] w/ PMH as below who is seen today for f/u and management of multiple medical problems.    Nursing staff report that wife concerned he has a UTI as he has been weak and leaning to the side. I ordered labs and intially ordered UA, but did receive a message from nursing staff the following day that he was asymptomatic so indicated that he did not meet criteria. Labs appreciated as below. Urine not yet collected or sent as of today.    He is seen sitting up in his WC and denies any f/c, sweats, CP, SOB, cough, n/v, constipation, diarrhea, but now reports some dysuria, no abd pain, no freuqncy, no incontinence, or any other c/o presently.    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, dementia, hypothyroidism, frequent falls, SDH  PSH - hernia repair, cataract extraction  FH - Father had heart disease, dementia, mother had HTN  SocHx -  former smoker, No EtOH    *Review of Systems*  All other systems reviewed are negative except as noted in  the HPI     *Vital Siins*   Date: 1/2/25  - T: 98.0  P: 62  R: 16  BP: 113/71  SpO2: 95% on RA ; Date: 1/4/25  Wt: 154.2    *Results / Data*  CBC - Date: 1/24/25  WBC: 5.54  HGB: 13.8  HCT: 40.3  PLT: 232  ;   BMP - Date: 1/24/25  Na: 140  K: 4.3  Cl: 104  CO2: 29  BUN: 10  Cr: 0.74  Glu: 91  Ca: 9.0  ;   LFT - Date: 1/24/25  AST: 20  ALT: 22  ALP: 107  Tbili: 0.4  ;   Coags - Date:   INR:   PT:  Other - Date: 3/29/23 - TSH: 1.100  T4: 5.8 ; 5/15/23 - TSH: 1.530; 5/8/24  TSH: 1.960; 1/24/25  TSH: 2.180    *Physical Exam*  Gen: (+) NAD, (+) well-appearing  HEENT: (+) normocephalic, (+) MMM  Neck: (+) supple  Lungs: (+) CTAB, (-) wheezes, (-) rales, (-) rhonchi  Heart: (+) irreg irreg, (+) S1 S2  Pulses: (+) palpable  Abd: (+) soft, (+) NT, (+) ND, (+) BS+  Ext: (-) edema, (-) deformity  MSK: (-) joint swelling  Skin: (+) warm, (+) dry, (-) rash  Neuro: (+) follows commands, (-) tremor, (+) alert

## 2025-02-13 ENCOUNTER — NURSING HOME VISIT (OUTPATIENT)
Dept: POST ACUTE CARE | Facility: EXTERNAL LOCATION | Age: 80
End: 2025-02-13
Payer: MEDICARE

## 2025-02-13 DIAGNOSIS — I10 BENIGN HYPERTENSION: ICD-10-CM

## 2025-02-13 DIAGNOSIS — R13.10 DYSPHAGIA, UNSPECIFIED TYPE: ICD-10-CM

## 2025-02-13 DIAGNOSIS — I48.91 ATRIAL FIBRILLATION, UNSPECIFIED TYPE (MULTI): Primary | ICD-10-CM

## 2025-02-13 PROCEDURE — 99308 SBSQ NF CARE LOW MDM 20: CPT | Performed by: FAMILY MEDICINE

## 2025-02-13 NOTE — LETTER
Patient: Rylan Garcia  : 1945    Encounter Date: 2025    Resident seen 25 -- MP    CC: LTC (Nat) Recheck    : 1945  SNF H&P done 3/23/23  Allergy: Aricept  DNR-CCA    S: 78 yo man with dementia, hypothyroidism, HTN, recent CHI, SHD s/p drain. No CP/SOB. Med List & Problem list reviewed.    O: VSS AFEB 157# (up 3#) Awake, pleasantly confused, NAD. Chest cta. heart RRR. Left hemiplegia.    LAB (24) Alb 3.5, Hgb 13.2, Cr 0.8, TSH 1.96, T4 4.8 Na 139, K 4.1    A/P:  # Xerotic eczema: Controlled with HC ointment and prn benadryl.  # Hyperkeratosis: prn lachydrin.  # UE weakness: LTC. completed part B PT.  # Dysphagia: Mech soft/nectar. Gaining weight in assist dining room.  # Gait Instability/Falls/CHI/Left hemiplegia d/t SDH: fall precautions. Completed SNF PT/OT. Last saw Dr. Ramos (NS) by telehealth 23. CT Head 2023 stable Right SDH.  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  # BPH w LUTS: alfuzosin 10, finasteride 5.  # MDD: tolerated GDR sertraline to 25 mg  # Hypothyroidism: Euthyroid 2024 on 25 mcg LT4  # HTN: stable on alpha blocker. No falls since 2024.  # PACs: NO pAFIB per EP [place] 10/27/23.  # Left ureteral stone 8x11mm with hydronephrosis per CT 23: patient asymptomatic with stable Creatinine.      Electronically Signed By: David Hong MD   25  4:50 PM

## 2025-02-16 NOTE — PROGRESS NOTES
Resident seen 25 -- MP    CC: LT (Nat) Recheck    : 1945  SNF H&P done 3/23/23  Allergy: Aricept  DNR-CCA    S: 78 yo man with dementia, hypothyroidism, HTN, recent CHI, SHD s/p drain. No CP/SOB. Med List & Problem list reviewed.    O: VSS AFEB 157# (up 3#) Awake, pleasantly confused, NAD. Chest cta. heart RRR. Left hemiplegia.    LAB (24) Alb 3.5, Hgb 13.2, Cr 0.8, TSH 1.96, T4 4.8 Na 139, K 4.1    A/P:  # Xerotic eczema: Controlled with HC ointment and prn benadryl.  # Hyperkeratosis: prn lachydrin.  # UE weakness: LTC. completed part B PT.  # Dysphagia: Mech soft/nectar. Gaining weight in assist dining room.  # Gait Instability/Falls/CHI/Left hemiplegia d/t SDH: fall precautions. Completed SNF PT/OT. Last saw Dr. Ramos (NS) by telehealth 23. CT Head 2023 stable Right SDH.  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  # BPH w LUTS: alfuzosin 10, finasteride 5.  # MDD: tolerated GDR sertraline to 25 mg  # Hypothyroidism: Euthyroid 2024 on 25 mcg LT4  # HTN: stable on alpha blocker. No falls since 2024.  # PACs: NO pAFIB per EP [place] 10/27/23.  # Left ureteral stone 8x11mm with hydronephrosis per CT 23: patient asymptomatic with stable Creatinine.

## 2025-03-06 ENCOUNTER — NURSING HOME VISIT (OUTPATIENT)
Dept: POST ACUTE CARE | Facility: EXTERNAL LOCATION | Age: 80
End: 2025-03-06
Payer: MEDICARE

## 2025-03-06 DIAGNOSIS — R13.10 DYSPHAGIA, UNSPECIFIED TYPE: ICD-10-CM

## 2025-03-06 DIAGNOSIS — M62.81 MUSCLE WEAKNESS (GENERALIZED): Primary | ICD-10-CM

## 2025-03-06 DIAGNOSIS — S06.5XAA SDH (SUBDURAL HEMATOMA) (MULTI): ICD-10-CM

## 2025-03-06 DIAGNOSIS — I48.91 ATRIAL FIBRILLATION, UNSPECIFIED TYPE (MULTI): ICD-10-CM

## 2025-03-06 PROCEDURE — 99309 SBSQ NF CARE MODERATE MDM 30: CPT | Performed by: FAMILY MEDICINE

## 2025-03-06 NOTE — LETTER
Patient: Rylan Garcia  : 1945    Encounter Date: 2025    Resident seen 3/6/25 -- MP    CC: LTC (Nat) Recheck    : 1945  SNF H&P done 3/23/23  Allergy: Aricept  DNR-CCA    S: 80 yo man with dementia, hypothyroidism, HTN, recent CHI, SHD s/p drain. No CP/SOB. Med List & Problem list reviewed.    O: VSS AFEB 147# (down 10#) Awake, pleasantly confused, NAD. Chest cta. heart RRR. Left hemiplegia.    LAB (24) Alb 3.5, Hgb 13.2, Cr 0.8, TSH 1.96, T4 4.8 Na 139, K 4.1    A/P:  # 3/6/25 F2F Mobility eval: Rylan requires a custom WC with a custom seating system to accommodate his special positioning needs.  Rylan will be seen for PT/OT evaluation for seating and mobility needs available in-house in his LTC setting.   # Xerotic eczema: Controlled with HC ointment and prn benadryl.  # Hyperkeratosis: prn lachydrin.  # UE weakness: LTC. completed part B PT.  # Dysphagia: Mech soft/nectar. Gaining weight in assist dining room.  # Gait Instability/Falls/CHI/Left hemiplegia d/t SDH: fall precautions. Completed SNF PT/OT. Last saw Dr. Ramos (NS) by telehealth 23. CT Head 2023 stable Right SDH.  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  # BPH w LUTS: alfuzosin 10, finasteride 5.  # MDD: tolerated GDR sertraline to 25 mg  # Hypothyroidism: Euthyroid 2024 on 25 mcg LT4  # HTN: stable on alpha blocker. No falls since 2024.  # PACs: NO pAFIB per EP [place] 10/27/23.  # Left ureteral stone 8x11mm with hydronephrosis per CT 23: patient asymptomatic with stable Creatinine.      Electronically Signed By: David Hong MD   3/6/25  6:33 PM

## 2025-03-06 NOTE — PROGRESS NOTES
Resident seen 3/6/25 -- MP    CC: LTC (Nat) Recheck    : 1945  SNF H&P done 3/23/23  Allergy: Aricept  DNR-CCA    S: 78 yo man with dementia, hypothyroidism, HTN, recent CHI, SHD s/p drain. No CP/SOB. Med List & Problem list reviewed.    O: VSS AFEB 147# (down 10#) Awake, pleasantly confused, NAD. Chest cta. heart RRR. Left hemiplegia.    LAB (24) Alb 3.5, Hgb 13.2, Cr 0.8, TSH 1.96, T4 4.8 Na 139, K 4.1    A/P:  # 3/6/25 F2F Mobility eval: Rylan requires a custom WC with a custom seating system to accommodate his special positioning needs.  Rylan will be seen for PT/OT evaluation for seating and mobility needs available in-house in his LTC setting.   # Xerotic eczema: Controlled with HC ointment and prn benadryl.  # Hyperkeratosis: prn lachydrin.  # UE weakness: LTC. completed part B PT.  # Dysphagia: Mech soft/nectar. Gaining weight in assist dining room.  # Gait Instability/Falls/CHI/Left hemiplegia d/t SDH: fall precautions. Completed SNF PT/OT. Last saw Dr. Raoms (NS) by telehealth 23. CT Head 2023 stable Right SDH.  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  # BPH w LUTS: alfuzosin 10, finasteride 5.  # MDD: tolerated GDR sertraline to 25 mg  # Hypothyroidism: Euthyroid 2024 on 25 mcg LT4  # HTN: stable on alpha blocker. No falls since 2024.  # PACs: NO pAFIB per EP [place] 10/27/23.  # Left ureteral stone 8x11mm with hydronephrosis per CT 23: patient asymptomatic with stable Creatinine.

## 2025-04-10 ENCOUNTER — NURSING HOME VISIT (OUTPATIENT)
Dept: POST ACUTE CARE | Facility: EXTERNAL LOCATION | Age: 80
End: 2025-04-10
Payer: MEDICARE

## 2025-04-10 DIAGNOSIS — N40.1 BENIGN PROSTATIC HYPERPLASIA WITH LOWER URINARY TRACT SYMPTOMS, SYMPTOM DETAILS UNSPECIFIED: ICD-10-CM

## 2025-04-10 DIAGNOSIS — R13.10 DYSPHAGIA, UNSPECIFIED TYPE: ICD-10-CM

## 2025-04-10 DIAGNOSIS — I48.91 ATRIAL FIBRILLATION, UNSPECIFIED TYPE (MULTI): Primary | ICD-10-CM

## 2025-04-10 NOTE — LETTER
Patient: Rylan Garcia  : 1945    Encounter Date: 04/10/2025    Resident seen 4/10/25 -- MP    CC: LTC (Nat) Recheck    : 1945  SNF H&P done 3/23/23  Allergy: Aricept  DNR-CCA    S: 81 yo man with dementia, hypothyroidism, HTN, recent CHI, SHD s/p drain. No CP/SOB. Last fall 2024, out of soft helmet. Med List & Problem list reviewed.    O: VSS AFEB 151# (up 4#) Awake, pleasantly confused, NAD. Chest cta. heart RRR. Left hemiplegia.    LAB (24) Alb 3.5, Hgb 13.2, Cr 0.8, TSH 1.96, T4 4.8 Na 139, K 4.1    A/P:  # F2F Mobility eval (3/6/25)  # Xerotic eczema: Controlled with HC ointment and prn benadryl.  # Hyperkeratosis: prn lachydrin.  # UE weakness: LTC. completed part B PT.  # Dysphagia: [facility] soft/nectar. Gaining weight in assist dining room.  # Gait Instability/Falls/CHI/Left hemiplegia d/t SDH: out of soft helmet.  Completed SNF PT/OT. Last saw Dr. Ramos (NS) by telehealth 23. CT Head 2023 stable Right SDH.  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  # BPH w LUTS: alfuzosin 10, finasteride 5.  # MDD: tolerated GDR sertraline to 25 mg  # Hypothyroidism: Euthyroid 2024 on 25 mcg LT4  # HTN: stable on alpha blocker. No falls since 2024.  # PACs: NO pAFIB per EP [place] 10/27/23.  # Left ureteral stone 8x11mm with hydronephrosis per CT 23: patient asymptomatic with stable Creatinine.      Electronically Signed By: David Hong MD   25  3:44 PM  
Statement Selected

## 2025-04-12 NOTE — PROGRESS NOTES
Resident seen 4/10/25 -- MP    CC: LT (Nat) Recheck    : 1945  SNF H&P done 3/23/23  Allergy: Aricept  DNR-CCA    S: 81 yo man with dementia, hypothyroidism, HTN, recent CHI, SHD s/p drain. No CP/SOB. Last fall 2024, out of soft helmet. Med List & Problem list reviewed.    O: VSS AFEB 151# (up 4#) Awake, pleasantly confused, NAD. Chest cta. heart RRR. Left hemiplegia.    LAB (24) Alb 3.5, Hgb 13.2, Cr 0.8, TSH 1.96, T4 4.8 Na 139, K 4.1    A/P:  # F2F Mobility eval (3/6/25)  # Xerotic eczema: Controlled with HC ointment and prn benadryl.  # Hyperkeratosis: prn lachydrin.  # UE weakness: LTC. completed part B PT.  # Dysphagia: Mech soft/nectar. Gaining weight in assist dining room.  # Gait Instability/Falls/CHI/Left hemiplegia d/t SDH: out of soft helmet.  Completed SNF PT/OT. Last saw Dr. Ramos (NS) by telehealth 23. CT Head 2023 stable Right SDH.  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  # BPH w LUTS: alfuzosin 10, finasteride 5.  # MDD: tolerated GDR sertraline to 25 mg  # Hypothyroidism: Euthyroid 2024 on 25 mcg LT4  # HTN: stable on alpha blocker. No falls since 2024.  # PACs: NO pAFIB per EP [place] 10/27/23.  # Left ureteral stone 8x11mm with hydronephrosis per CT 23: patient asymptomatic with stable Creatinine.

## 2025-05-08 ENCOUNTER — NURSING HOME VISIT (OUTPATIENT)
Dept: POST ACUTE CARE | Facility: EXTERNAL LOCATION | Age: 80
End: 2025-05-08
Payer: MEDICARE

## 2025-05-08 DIAGNOSIS — M19.90 OSTEOARTHRITIS, UNSPECIFIED OSTEOARTHRITIS TYPE, UNSPECIFIED SITE: ICD-10-CM

## 2025-05-08 DIAGNOSIS — I10 PRIMARY HYPERTENSION: Primary | ICD-10-CM

## 2025-05-08 DIAGNOSIS — S06.5XAA SDH (SUBDURAL HEMATOMA) (MULTI): ICD-10-CM

## 2025-05-08 PROCEDURE — 99308 SBSQ NF CARE LOW MDM 20: CPT | Performed by: FAMILY MEDICINE

## 2025-05-08 NOTE — LETTER
Patient: Rylan Garcia  : 1945    Encounter Date: 2025    Resident seen 25 -- MP    CC: LTC (Nat) Recheck    : 1945  SNF H&P done 3/23/23  Allergy: Aricept  DNR-CCA    S: 81 yo man with dementia, hypothyroidism, HTN, recent CHI, SHD s/p drain. No CP/SOB. Med List & Problem list reviewed.    O: VSS AFEB 148# (down 3#) Awake, pleasantly confused, NAD. Chest cta. heart RRR. Left hemiplegia.    LAB (24) Alb 3.5, Hgb 13.2, Cr 0.8, TSH 1.96, T4 4.8 Na 139, K 4.1    A/P:  # F2F Mobility eval (3/6/25)  # Xerotic eczema: Controlled with HC ointment and prn benadryl.  # Hyperkeratosis: prn lachydrin.  # UE weakness: LTC. completed part B PT.  # Dysphagia: [facility] soft/nectar. Gaining weight in assist dining room.  # Gait Instability/Falls/CHI/Left hemiplegia d/t SDH: out of soft helmet. Completed SNF PT/OT. Last saw Dr. Ramos (NS) by telehealth 23. CT Head 2023 stable Right SDH.  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  # BPH w LUTS: alfuzosin 10, finasteride 5.  # MDD: tolerated GDR sertraline to 25 mg  # Hypothyroidism: Euthyroid 2024 on 25 mcg LT4  # HTN: stable on alpha blocker. No falls since 2024.  # PACs: NO pAFIB per EP [place] 10/27/23.  # Left ureteral stone 8x11mm with hydronephrosis per CT 23: patient asymptomatic with stable Creatinine.    Electronically Signed By: David Hong MD   25  4:02 PM

## 2025-05-08 NOTE — PROGRESS NOTES
Resident seen 25 -- MP    CC: LT (Nat) Recheck    : 1945  SNF H&P done 3/23/23  Allergy: Aricept  DNR-CCA    S: 81 yo man with dementia, hypothyroidism, HTN, recent CHI, SHD s/p drain. No CP/SOB. Med List & Problem list reviewed.    O: VSS AFEB 148# (down 3#) Awake, pleasantly confused, NAD. Chest cta. heart RRR. Left hemiplegia.    LAB (24) Alb 3.5, Hgb 13.2, Cr 0.8, TSH 1.96, T4 4.8 Na 139, K 4.1    A/P:  # F2F Mobility eval (3/6/25)  # Xerotic eczema: Controlled with HC ointment and prn benadryl.  # Hyperkeratosis: prn lachydrin.  # UE weakness: LT. completed part B PT.  # [facility] Gaining weight in assist dining room.  # Gait Instability/Falls/CHI/Left hemiplegia d/t SDH: out of soft helmet. Completed SNF PT/OT. Last saw Dr. Ramos (NS) by telehealth 23. CT Head 2023 stable Right SDH.  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  # BPH w LUTS: alfuzosin 10, finasteride 5.  # MDD: tolerated GDR sertraline to 25 mg  # Hypothyroidism: Euthyroid 2024 on 25 mcg LT4  # HTN: stable on alpha blocker. No falls since 2024.  # PACs: NO pAFIB per EP [place] 10/27/23.  # Left ureteral stone 8x11mm with hydronephrosis per CT 23: patient asymptomatic with stable Creatinine.

## 2025-06-12 ENCOUNTER — NURSING HOME VISIT (OUTPATIENT)
Dept: POST ACUTE CARE | Facility: EXTERNAL LOCATION | Age: 80
End: 2025-06-12
Payer: MEDICARE

## 2025-06-12 DIAGNOSIS — I10 PRIMARY HYPERTENSION: Primary | ICD-10-CM

## 2025-06-12 DIAGNOSIS — M19.90 OSTEOARTHRITIS, UNSPECIFIED OSTEOARTHRITIS TYPE, UNSPECIFIED SITE: ICD-10-CM

## 2025-06-12 DIAGNOSIS — E03.9 HYPOTHYROIDISM, ADULT: ICD-10-CM

## 2025-06-12 NOTE — LETTER
Patient: Rylan Garcia  : 1945    Encounter Date: 2025    Resident seen 25 -- MP    CC: LTC (Nat) Recheck    : 1945  SNF H&P done 3/23/23  Allergy: Aricept  DNR-CCA    S: 79 yo man with dementia, hypothyroidism, HTN, recent CHI, SHD s/p drain. No CP/SOB. Med List & Problem list reviewed.    O: VSS AFEB 150# (up 2#) Awake, pleasantly confused, NAD. Chest cta. heart RRR. Left hemiplegia.    LAB (24) Alb 3.5, Hgb 13.2, Cr 0.8, TSH 1.96, T4 4.8 Na 139, K 4.1    A/P:  # F2F Mobility eval (3/6/25)  # Xerotic eczema: Controlled with HC ointment and prn benadryl.  # Hyperkeratosis: prn lachydrin.  # UE weakness: LTC. completed part B PT.  # Dysphagia: [facility] soft/nectar. Gaining weight in assist dining room.  # Gait Instability/Falls/CHI/Left hemiplegia d/t SDH: out of soft helmet. Completed SNF PT/OT. Last saw Dr. Ramos (NS) by telehealth 23. CT Head 2023 stable Right SDH.  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  # BPH w LUTS: alfuzosin 10, finasteride 5.  # MDD: tolerated GDR sertraline to 25 mg  # Hypothyroidism: Euthyroid 2024 on 25 mcg LT4  # HTN: stable on alpha blocker. No falls since 2024.  # PACs: NO pAFIB per EP [place] 10/27/23.  # Left ureteral stone 8x11mm with hydronephrosis per CT 23: patient asymptomatic with stable Creatinine.    Electronically Signed By: David Hong MD   25  4:02 PM

## 2025-06-12 NOTE — PROGRESS NOTES
Resident seen 25 -- MP    CC: LT (Nat) Recheck    : 1945  SNF H&P done 3/23/23  Allergy: Aricept  DNR-CCA    S: 81 yo man with dementia, hypothyroidism, HTN, recent CHI, SHD s/p drain. No CP/SOB. Med List & Problem list reviewed.    O: VSS AFEB 150# (up 2#) Awake, pleasantly confused, NAD. Chest cta. heart RRR. Left hemiplegia.    LAB (24) Alb 3.5, Hgb 13.2, Cr 0.8, TSH 1.96, T4 4.8 Na 139, K 4.1    A/P:  # F2F Mobility eval (3/6/25)  # Xerotic eczema: Controlled with HC ointment and prn benadryl.  # Hyperkeratosis: prn lachydrin.  # UE weakness: LT. completed part B PT.  # [facility] Gaining weight in assist dining room.  # Gait Instability/Falls/CHI/Left hemiplegia d/t SDH: out of soft helmet. Completed SNF PT/OT. Last saw Dr. Ramos (NS) by telehealth 23. CT Head 2023 stable Right SDH.  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  # BPH w LUTS: alfuzosin 10, finasteride 5.  # MDD: tolerated GDR sertraline to 25 mg  # Hypothyroidism: Euthyroid 2024 on 25 mcg LT4  # HTN: stable on alpha blocker. No falls since 2024.  # PACs: NO pAFIB per EP [place] 10/27/23.  # Left ureteral stone 8x11mm with hydronephrosis per CT 23: patient asymptomatic with stable Creatinine.

## 2025-06-17 ENCOUNTER — HOSPITAL ENCOUNTER (INPATIENT)
Facility: HOSPITAL | Age: 80
LOS: 2 days | Discharge: HOSPICE | DRG: 871 | End: 2025-06-20
Attending: EMERGENCY MEDICINE | Admitting: STUDENT IN AN ORGANIZED HEALTH CARE EDUCATION/TRAINING PROGRAM
Payer: MEDICARE

## 2025-06-17 ENCOUNTER — APPOINTMENT (OUTPATIENT)
Dept: CARDIOLOGY | Facility: HOSPITAL | Age: 80
DRG: 871 | End: 2025-06-17
Payer: MEDICARE

## 2025-06-17 ENCOUNTER — APPOINTMENT (OUTPATIENT)
Dept: RADIOLOGY | Facility: HOSPITAL | Age: 80
DRG: 871 | End: 2025-06-17
Payer: MEDICARE

## 2025-06-17 DIAGNOSIS — J18.9 PNEUMONIA DUE TO INFECTIOUS ORGANISM, UNSPECIFIED LATERALITY, UNSPECIFIED PART OF LUNG: Primary | ICD-10-CM

## 2025-06-17 DIAGNOSIS — M89.8X9 LYTIC BONE LESIONS ON XRAY: ICD-10-CM

## 2025-06-17 DIAGNOSIS — R00.0 TACHYCARDIA: ICD-10-CM

## 2025-06-17 LAB
ALBUMIN SERPL BCP-MCNC: 3.7 G/DL (ref 3.4–5)
ALP SERPL-CCNC: 101 U/L (ref 33–136)
ALT SERPL W P-5'-P-CCNC: 33 U/L (ref 10–52)
ANION GAP BLDV CALCULATED.4IONS-SCNC: 4 MMOL/L (ref 10–25)
ANION GAP SERPL CALC-SCNC: 13 MMOL/L (ref 10–20)
AST SERPL W P-5'-P-CCNC: 18 U/L (ref 9–39)
BASE EXCESS BLDV CALC-SCNC: 0.5 MMOL/L (ref -2–3)
BASOPHILS # BLD AUTO: 0.03 X10*3/UL (ref 0–0.1)
BASOPHILS NFR BLD AUTO: 0.3 %
BILIRUB SERPL-MCNC: 0.4 MG/DL (ref 0–1.2)
BODY TEMPERATURE: ABNORMAL
BUN SERPL-MCNC: 16 MG/DL (ref 6–23)
CA-I BLDV-SCNC: 1.33 MMOL/L (ref 1.1–1.33)
CALCIUM SERPL-MCNC: 8.6 MG/DL (ref 8.6–10.3)
CARDIAC TROPONIN I PNL SERPL HS: 8 NG/L (ref 0–20)
CARDIAC TROPONIN I PNL SERPL HS: 8 NG/L (ref 0–20)
CHLORIDE BLDV-SCNC: 107 MMOL/L (ref 98–107)
CHLORIDE SERPL-SCNC: 105 MMOL/L (ref 98–107)
CO2 SERPL-SCNC: 21 MMOL/L (ref 21–32)
CREAT SERPL-MCNC: 0.75 MG/DL (ref 0.5–1.3)
EGFRCR SERPLBLD CKD-EPI 2021: >90 ML/MIN/1.73M*2
EOSINOPHIL # BLD AUTO: 0.04 X10*3/UL (ref 0–0.4)
EOSINOPHIL NFR BLD AUTO: 0.4 %
ERYTHROCYTE [DISTWIDTH] IN BLOOD BY AUTOMATED COUNT: 13.2 % (ref 11.5–14.5)
GLUCOSE BLDV-MCNC: 127 MG/DL (ref 74–99)
GLUCOSE SERPL-MCNC: 120 MG/DL (ref 74–99)
HCO3 BLDV-SCNC: 24.6 MMOL/L (ref 22–26)
HCT VFR BLD AUTO: 39.2 % (ref 41–52)
HCT VFR BLD EST: 39 % (ref 41–52)
HGB BLD-MCNC: 13.1 G/DL (ref 13.5–17.5)
HGB BLDV-MCNC: 13 G/DL (ref 13.5–17.5)
IMM GRANULOCYTES # BLD AUTO: 0.03 X10*3/UL (ref 0–0.5)
IMM GRANULOCYTES NFR BLD AUTO: 0.3 % (ref 0–0.9)
INHALED O2 CONCENTRATION: 21 %
LACTATE BLDV-SCNC: 1.4 MMOL/L (ref 0.4–2)
LACTATE SERPL-SCNC: 1.2 MMOL/L (ref 0.4–2)
LYMPHOCYTES # BLD AUTO: 1.47 X10*3/UL (ref 0.8–3)
LYMPHOCYTES NFR BLD AUTO: 16.5 %
MCH RBC QN AUTO: 32.7 PG (ref 26–34)
MCHC RBC AUTO-ENTMCNC: 33.4 G/DL (ref 32–36)
MCV RBC AUTO: 98 FL (ref 80–100)
MONOCYTES # BLD AUTO: 0.62 X10*3/UL (ref 0.05–0.8)
MONOCYTES NFR BLD AUTO: 7 %
NEUTROPHILS # BLD AUTO: 6.7 X10*3/UL (ref 1.6–5.5)
NEUTROPHILS NFR BLD AUTO: 75.5 %
NRBC BLD-RTO: 0 /100 WBCS (ref 0–0)
OXYHGB MFR BLDV: 68.6 % (ref 45–75)
PCO2 BLDV: 37 MM HG (ref 41–51)
PH BLDV: 7.43 PH (ref 7.33–7.43)
PLATELET # BLD AUTO: 260 X10*3/UL (ref 150–450)
PO2 BLDV: 41 MM HG (ref 35–45)
POTASSIUM BLDV-SCNC: 4.3 MMOL/L (ref 3.5–5.3)
POTASSIUM SERPL-SCNC: 4.1 MMOL/L (ref 3.5–5.3)
PROT SERPL-MCNC: 6.3 G/DL (ref 6.4–8.2)
RBC # BLD AUTO: 4.01 X10*6/UL (ref 4.5–5.9)
SAO2 % BLDV: 70 % (ref 45–75)
SODIUM BLDV-SCNC: 131 MMOL/L (ref 136–145)
SODIUM SERPL-SCNC: 135 MMOL/L (ref 136–145)
WBC # BLD AUTO: 8.9 X10*3/UL (ref 4.4–11.3)

## 2025-06-17 PROCEDURE — 84484 ASSAY OF TROPONIN QUANT: CPT | Performed by: EMERGENCY MEDICINE

## 2025-06-17 PROCEDURE — 71250 CT THORAX DX C-: CPT | Performed by: STUDENT IN AN ORGANIZED HEALTH CARE EDUCATION/TRAINING PROGRAM

## 2025-06-17 PROCEDURE — 71045 X-RAY EXAM CHEST 1 VIEW: CPT

## 2025-06-17 PROCEDURE — 70450 CT HEAD/BRAIN W/O DYE: CPT

## 2025-06-17 PROCEDURE — 36415 COLL VENOUS BLD VENIPUNCTURE: CPT | Performed by: EMERGENCY MEDICINE

## 2025-06-17 PROCEDURE — 96361 HYDRATE IV INFUSION ADD-ON: CPT

## 2025-06-17 PROCEDURE — 96365 THER/PROPH/DIAG IV INF INIT: CPT

## 2025-06-17 PROCEDURE — 71250 CT THORAX DX C-: CPT

## 2025-06-17 PROCEDURE — 83605 ASSAY OF LACTIC ACID: CPT | Performed by: EMERGENCY MEDICINE

## 2025-06-17 PROCEDURE — 71045 X-RAY EXAM CHEST 1 VIEW: CPT | Performed by: SURGERY

## 2025-06-17 PROCEDURE — 96368 THER/DIAG CONCURRENT INF: CPT

## 2025-06-17 PROCEDURE — 2500000004 HC RX 250 GENERAL PHARMACY W/ HCPCS (ALT 636 FOR OP/ED): Performed by: EMERGENCY MEDICINE

## 2025-06-17 PROCEDURE — 2500000002 HC RX 250 W HCPCS SELF ADMINISTERED DRUGS (ALT 637 FOR MEDICARE OP, ALT 636 FOR OP/ED): Performed by: EMERGENCY MEDICINE

## 2025-06-17 PROCEDURE — 96375 TX/PRO/DX INJ NEW DRUG ADDON: CPT

## 2025-06-17 PROCEDURE — 70450 CT HEAD/BRAIN W/O DYE: CPT | Performed by: STUDENT IN AN ORGANIZED HEALTH CARE EDUCATION/TRAINING PROGRAM

## 2025-06-17 PROCEDURE — 83880 ASSAY OF NATRIURETIC PEPTIDE: CPT | Performed by: EMERGENCY MEDICINE

## 2025-06-17 PROCEDURE — 93005 ELECTROCARDIOGRAM TRACING: CPT

## 2025-06-17 PROCEDURE — 74176 CT ABD & PELVIS W/O CONTRAST: CPT | Performed by: STUDENT IN AN ORGANIZED HEALTH CARE EDUCATION/TRAINING PROGRAM

## 2025-06-17 PROCEDURE — 84132 ASSAY OF SERUM POTASSIUM: CPT | Performed by: EMERGENCY MEDICINE

## 2025-06-17 PROCEDURE — 85025 COMPLETE CBC W/AUTO DIFF WBC: CPT | Performed by: EMERGENCY MEDICINE

## 2025-06-17 PROCEDURE — 99291 CRITICAL CARE FIRST HOUR: CPT | Mod: 25 | Performed by: EMERGENCY MEDICINE

## 2025-06-17 PROCEDURE — 87075 CULTR BACTERIA EXCEPT BLOOD: CPT | Mod: GEALAB | Performed by: EMERGENCY MEDICINE

## 2025-06-17 PROCEDURE — 2500000005 HC RX 250 GENERAL PHARMACY W/O HCPCS: Performed by: EMERGENCY MEDICINE

## 2025-06-17 RX ORDER — IPRATROPIUM BROMIDE AND ALBUTEROL SULFATE 2.5; .5 MG/3ML; MG/3ML
3 SOLUTION RESPIRATORY (INHALATION) ONCE
Status: COMPLETED | OUTPATIENT
Start: 2025-06-17 | End: 2025-06-17

## 2025-06-17 RX ORDER — DILTIAZEM HYDROCHLORIDE 5 MG/ML
5 INJECTION INTRAVENOUS ONCE
Status: COMPLETED | OUTPATIENT
Start: 2025-06-17 | End: 2025-06-17

## 2025-06-17 RX ORDER — LORAZEPAM 2 MG/ML
1 INJECTION INTRAMUSCULAR ONCE
Status: DISCONTINUED | OUTPATIENT
Start: 2025-06-17 | End: 2025-06-17

## 2025-06-17 RX ADMIN — IPRATROPIUM BROMIDE AND ALBUTEROL SULFATE 3 ML: .5; 3 SOLUTION RESPIRATORY (INHALATION) at 23:10

## 2025-06-17 RX ADMIN — SODIUM CHLORIDE 1000 ML: 0.9 INJECTION, SOLUTION INTRAVENOUS at 23:47

## 2025-06-17 RX ADMIN — DILTIAZEM HYDROCHLORIDE 5 MG: 5 INJECTION, SOLUTION INTRAVENOUS at 23:46

## 2025-06-17 RX ADMIN — SODIUM CHLORIDE 1000 ML: 9 INJECTION, SOLUTION INTRAVENOUS at 22:30

## 2025-06-17 RX ADMIN — VANCOMYCIN HYDROCHLORIDE 2 G: 10 INJECTION, POWDER, LYOPHILIZED, FOR SOLUTION INTRAVENOUS at 23:13

## 2025-06-17 RX ADMIN — PIPERACILLIN SODIUM AND TAZOBACTAM SODIUM 4.5 G: 4; .5 INJECTION, SOLUTION INTRAVENOUS at 23:13

## 2025-06-18 PROBLEM — J18.9 PNEUMONIA DUE TO INFECTIOUS ORGANISM, UNSPECIFIED LATERALITY, UNSPECIFIED PART OF LUNG: Status: ACTIVE | Noted: 2025-06-18

## 2025-06-18 LAB
ALBUMIN SERPL BCP-MCNC: 3.3 G/DL (ref 3.4–5)
ANION GAP SERPL CALC-SCNC: 11 MMOL/L (ref 10–20)
APPEARANCE UR: CLEAR
BILIRUB UR STRIP.AUTO-MCNC: NEGATIVE MG/DL
BNP SERPL-MCNC: 258 PG/ML (ref 0–99)
BUN SERPL-MCNC: 12 MG/DL (ref 6–23)
CALCIUM SERPL-MCNC: 8.2 MG/DL (ref 8.6–10.3)
CHLORIDE SERPL-SCNC: 107 MMOL/L (ref 98–107)
CO2 SERPL-SCNC: 23 MMOL/L (ref 21–32)
COLOR UR: YELLOW
CREAT SERPL-MCNC: 0.7 MG/DL (ref 0.5–1.3)
EGFRCR SERPLBLD CKD-EPI 2021: >90 ML/MIN/1.73M*2
ERYTHROCYTE [DISTWIDTH] IN BLOOD BY AUTOMATED COUNT: 13.2 % (ref 11.5–14.5)
GLUCOSE SERPL-MCNC: 103 MG/DL (ref 74–99)
GLUCOSE UR STRIP.AUTO-MCNC: NORMAL MG/DL
HCT VFR BLD AUTO: 36 % (ref 41–52)
HGB BLD-MCNC: 12.1 G/DL (ref 13.5–17.5)
HOLD SPECIMEN: NORMAL
KETONES UR STRIP.AUTO-MCNC: NEGATIVE MG/DL
LEUKOCYTE ESTERASE UR QL STRIP.AUTO: NEGATIVE
MAGNESIUM SERPL-MCNC: 1.96 MG/DL (ref 1.6–2.4)
MCH RBC QN AUTO: 32.1 PG (ref 26–34)
MCHC RBC AUTO-ENTMCNC: 33.6 G/DL (ref 32–36)
MCV RBC AUTO: 96 FL (ref 80–100)
MUCOUS THREADS #/AREA URNS AUTO: NORMAL /LPF
NITRITE UR QL STRIP.AUTO: NEGATIVE
NRBC BLD-RTO: 0 /100 WBCS (ref 0–0)
PH UR STRIP.AUTO: 5.5 [PH]
PHOSPHATE SERPL-MCNC: 3.6 MG/DL (ref 2.5–4.9)
PLATELET # BLD AUTO: 235 X10*3/UL (ref 150–450)
POTASSIUM SERPL-SCNC: 3.6 MMOL/L (ref 3.5–5.3)
PROT SERPL-MCNC: 5.8 G/DL (ref 6.4–8.2)
PROT UR STRIP.AUTO-MCNC: ABNORMAL MG/DL
PROT UR-ACNC: 40 MG/DL (ref 5–25)
RBC # BLD AUTO: 3.77 X10*6/UL (ref 4.5–5.9)
RBC # UR STRIP.AUTO: NEGATIVE MG/DL
RBC #/AREA URNS AUTO: NORMAL /HPF
SODIUM SERPL-SCNC: 137 MMOL/L (ref 136–145)
SP GR UR STRIP.AUTO: 1.03
UROBILINOGEN UR STRIP.AUTO-MCNC: NORMAL MG/DL
WBC # BLD AUTO: 7.4 X10*3/UL (ref 4.4–11.3)
WBC #/AREA URNS AUTO: NORMAL /HPF

## 2025-06-18 PROCEDURE — 2500000004 HC RX 250 GENERAL PHARMACY W/ HCPCS (ALT 636 FOR OP/ED)

## 2025-06-18 PROCEDURE — 80069 RENAL FUNCTION PANEL: CPT

## 2025-06-18 PROCEDURE — 86334 IMMUNOFIX E-PHORESIS SERUM: CPT | Mod: GEALAB

## 2025-06-18 PROCEDURE — 2060000001 HC INTERMEDIATE ICU ROOM DAILY

## 2025-06-18 PROCEDURE — 36415 COLL VENOUS BLD VENIPUNCTURE: CPT

## 2025-06-18 PROCEDURE — 96366 THER/PROPH/DIAG IV INF ADDON: CPT

## 2025-06-18 PROCEDURE — 99223 1ST HOSP IP/OBS HIGH 75: CPT

## 2025-06-18 PROCEDURE — 2500000002 HC RX 250 W HCPCS SELF ADMINISTERED DRUGS (ALT 637 FOR MEDICARE OP, ALT 636 FOR OP/ED)

## 2025-06-18 PROCEDURE — 87449 NOS EACH ORGANISM AG IA: CPT | Mod: GEALAB

## 2025-06-18 PROCEDURE — 85027 COMPLETE CBC AUTOMATED: CPT

## 2025-06-18 PROCEDURE — 83735 ASSAY OF MAGNESIUM: CPT

## 2025-06-18 PROCEDURE — 2500000001 HC RX 250 WO HCPCS SELF ADMINISTERED DRUGS (ALT 637 FOR MEDICARE OP)

## 2025-06-18 PROCEDURE — 96361 HYDRATE IV INFUSION ADD-ON: CPT

## 2025-06-18 PROCEDURE — 96374 THER/PROPH/DIAG INJ IV PUSH: CPT

## 2025-06-18 PROCEDURE — 2500000004 HC RX 250 GENERAL PHARMACY W/ HCPCS (ALT 636 FOR OP/ED): Performed by: EMERGENCY MEDICINE

## 2025-06-18 PROCEDURE — 84166 PROTEIN E-PHORESIS/URINE/CSF: CPT | Mod: GEALAB

## 2025-06-18 PROCEDURE — 87899 AGENT NOS ASSAY W/OPTIC: CPT | Mod: GEALAB

## 2025-06-18 PROCEDURE — 84155 ASSAY OF PROTEIN SERUM: CPT | Mod: GEALAB

## 2025-06-18 PROCEDURE — 92610 EVALUATE SWALLOWING FUNCTION: CPT | Mod: GN

## 2025-06-18 PROCEDURE — 81001 URINALYSIS AUTO W/SCOPE: CPT | Performed by: EMERGENCY MEDICINE

## 2025-06-18 RX ORDER — FINASTERIDE 5 MG/1
5 TABLET, FILM COATED ORAL DAILY
Status: DISCONTINUED | OUTPATIENT
Start: 2025-06-18 | End: 2025-06-20 | Stop reason: HOSPADM

## 2025-06-18 RX ORDER — DOXAZOSIN 2 MG/1
2 TABLET ORAL NIGHTLY
COMMUNITY

## 2025-06-18 RX ORDER — METOPROLOL TARTRATE 1 MG/ML
5 INJECTION, SOLUTION INTRAVENOUS ONCE
Status: COMPLETED | OUTPATIENT
Start: 2025-06-18 | End: 2025-06-18

## 2025-06-18 RX ORDER — SODIUM CHLORIDE, SODIUM LACTATE, POTASSIUM CHLORIDE, CALCIUM CHLORIDE 600; 310; 30; 20 MG/100ML; MG/100ML; MG/100ML; MG/100ML
50 INJECTION, SOLUTION INTRAVENOUS CONTINUOUS
Status: ACTIVE | OUTPATIENT
Start: 2025-06-18 | End: 2025-06-18

## 2025-06-18 RX ORDER — OXCARBAZEPINE 150 MG/1
300 TABLET, FILM COATED ORAL 2 TIMES DAILY
Status: DISCONTINUED | OUTPATIENT
Start: 2025-06-18 | End: 2025-06-20 | Stop reason: HOSPADM

## 2025-06-18 RX ORDER — MORPHINE SULFATE 4 MG/ML
4 INJECTION INTRAVENOUS
Status: DISCONTINUED | OUTPATIENT
Start: 2025-06-18 | End: 2025-06-20 | Stop reason: HOSPADM

## 2025-06-18 RX ORDER — FUROSEMIDE 10 MG/ML
40 INJECTION INTRAMUSCULAR; INTRAVENOUS ONCE
Status: COMPLETED | OUTPATIENT
Start: 2025-06-18 | End: 2025-06-18

## 2025-06-18 RX ORDER — ACETAMINOPHEN 325 MG/1
650 TABLET ORAL EVERY 4 HOURS PRN
Status: DISCONTINUED | OUTPATIENT
Start: 2025-06-18 | End: 2025-06-20

## 2025-06-18 RX ORDER — AZITHROMYCIN 500 MG/1
500 TABLET, FILM COATED ORAL EVERY 24 HOURS
Status: CANCELLED | OUTPATIENT
Start: 2025-06-18 | End: 2025-06-21

## 2025-06-18 RX ORDER — MORPHINE SULFATE 2 MG/ML
2 INJECTION, SOLUTION INTRAMUSCULAR; INTRAVENOUS
Status: DISCONTINUED | OUTPATIENT
Start: 2025-06-18 | End: 2025-06-20 | Stop reason: HOSPADM

## 2025-06-18 RX ORDER — DIGOXIN 0.25 MG/ML
125 INJECTION INTRAMUSCULAR; INTRAVENOUS ONCE
Status: COMPLETED | OUTPATIENT
Start: 2025-06-18 | End: 2025-06-18

## 2025-06-18 RX ORDER — ACETAMINOPHEN 160 MG/5ML
650 SOLUTION ORAL EVERY 4 HOURS PRN
Status: DISCONTINUED | OUTPATIENT
Start: 2025-06-18 | End: 2025-06-20

## 2025-06-18 RX ORDER — ALFUZOSIN HYDROCHLORIDE 10 MG/1
10 TABLET, EXTENDED RELEASE ORAL DAILY
Status: DISCONTINUED | OUTPATIENT
Start: 2025-06-18 | End: 2025-06-20 | Stop reason: HOSPADM

## 2025-06-18 RX ORDER — IPRATROPIUM BROMIDE AND ALBUTEROL SULFATE 2.5; .5 MG/3ML; MG/3ML
3 SOLUTION RESPIRATORY (INHALATION) EVERY 2 HOUR PRN
Status: DISCONTINUED | OUTPATIENT
Start: 2025-06-18 | End: 2025-06-20 | Stop reason: HOSPADM

## 2025-06-18 RX ORDER — TALC
3 POWDER (GRAM) TOPICAL NIGHTLY
Status: DISCONTINUED | OUTPATIENT
Start: 2025-06-18 | End: 2025-06-20 | Stop reason: HOSPADM

## 2025-06-18 RX ORDER — CEFTRIAXONE 1 G/50ML
1 INJECTION, SOLUTION INTRAVENOUS EVERY 24 HOURS
Status: DISCONTINUED | OUTPATIENT
Start: 2025-06-18 | End: 2025-06-18

## 2025-06-18 RX ORDER — GUAIFENESIN 600 MG/1
600 TABLET, EXTENDED RELEASE ORAL 2 TIMES DAILY
Status: CANCELLED | OUTPATIENT
Start: 2025-06-18

## 2025-06-18 RX ORDER — SERTRALINE HYDROCHLORIDE 50 MG/1
25 TABLET, FILM COATED ORAL DAILY
Status: DISCONTINUED | OUTPATIENT
Start: 2025-06-18 | End: 2025-06-20 | Stop reason: HOSPADM

## 2025-06-18 RX ORDER — IPRATROPIUM BROMIDE AND ALBUTEROL SULFATE 2.5; .5 MG/3ML; MG/3ML
3 SOLUTION RESPIRATORY (INHALATION) EVERY 4 HOURS PRN
Status: DISCONTINUED | OUTPATIENT
Start: 2025-06-18 | End: 2025-06-18

## 2025-06-18 RX ORDER — LEVOTHYROXINE SODIUM 25 UG/1
25 TABLET ORAL DAILY
Status: DISCONTINUED | OUTPATIENT
Start: 2025-06-18 | End: 2025-06-20 | Stop reason: HOSPADM

## 2025-06-18 RX ORDER — DOCUSATE SODIUM 100 MG/1
100 CAPSULE, LIQUID FILLED ORAL DAILY
Status: DISCONTINUED | OUTPATIENT
Start: 2025-06-18 | End: 2025-06-20 | Stop reason: HOSPADM

## 2025-06-18 RX ORDER — DOXAZOSIN 1 MG/1
2 TABLET ORAL NIGHTLY
Status: DISCONTINUED | OUTPATIENT
Start: 2025-06-18 | End: 2025-06-20 | Stop reason: HOSPADM

## 2025-06-18 RX ADMIN — METOPROLOL TARTRATE 5 MG: 5 INJECTION INTRAVENOUS at 17:43

## 2025-06-18 RX ADMIN — OXCARBAZEPINE 300 MG: 150 TABLET, FILM COATED ORAL at 11:38

## 2025-06-18 RX ADMIN — SERTRALINE HYDROCHLORIDE 25 MG: 50 TABLET ORAL at 11:38

## 2025-06-18 RX ADMIN — AZITHROMYCIN MONOHYDRATE 500 MG: 500 INJECTION, POWDER, LYOPHILIZED, FOR SOLUTION INTRAVENOUS at 12:04

## 2025-06-18 RX ADMIN — CEFTRIAXONE 1 G: 1 INJECTION, SOLUTION INTRAVENOUS at 11:33

## 2025-06-18 RX ADMIN — MORPHINE SULFATE 2 MG: 2 INJECTION, SOLUTION INTRAMUSCULAR; INTRAVENOUS at 11:50

## 2025-06-18 RX ADMIN — SODIUM CHLORIDE, SODIUM LACTATE, POTASSIUM CHLORIDE, AND CALCIUM CHLORIDE 50 ML/HR: .6; .31; .03; .02 INJECTION, SOLUTION INTRAVENOUS at 06:33

## 2025-06-18 RX ADMIN — FINASTERIDE 5 MG: 5 TABLET, FILM COATED ORAL at 11:38

## 2025-06-18 RX ADMIN — METOPROLOL TARTRATE 5 MG: 5 INJECTION INTRAVENOUS at 16:27

## 2025-06-18 RX ADMIN — FUROSEMIDE 40 MG: 10 INJECTION, SOLUTION INTRAMUSCULAR; INTRAVENOUS at 11:50

## 2025-06-18 RX ADMIN — DIGOXIN 125 MCG: 0.25 INJECTION INTRAMUSCULAR; INTRAVENOUS at 01:59

## 2025-06-18 RX ADMIN — LEVOTHYROXINE SODIUM 25 MCG: 25 TABLET ORAL at 11:38

## 2025-06-18 RX ADMIN — DOCUSATE SODIUM 100 MG: 100 CAPSULE, LIQUID FILLED ORAL at 11:38

## 2025-06-18 RX ADMIN — OXCARBAZEPINE 300 MG: 150 TABLET, FILM COATED ORAL at 21:11

## 2025-06-18 RX ADMIN — METOPROLOL TARTRATE 5 MG: 5 INJECTION INTRAVENOUS at 22:14

## 2025-06-18 RX ADMIN — PIPERACILLIN SODIUM AND TAZOBACTAM SODIUM 4.5 G: 4; .5 INJECTION, SOLUTION INTRAVENOUS at 18:19

## 2025-06-18 SDOH — SOCIAL STABILITY: SOCIAL INSECURITY
WITHIN THE LAST YEAR, HAVE YOU BEEN KICKED, HIT, SLAPPED, OR OTHERWISE PHYSICALLY HURT BY YOUR PARTNER OR EX-PARTNER?: PATIENT UNABLE TO ANSWER

## 2025-06-18 SDOH — SOCIAL STABILITY: SOCIAL INSECURITY: DO YOU FEEL ANYONE HAS EXPLOITED OR TAKEN ADVANTAGE OF YOU FINANCIALLY OR OF YOUR PERSONAL PROPERTY?: UNABLE TO ASSESS

## 2025-06-18 SDOH — ECONOMIC STABILITY: HOUSING INSECURITY: IN THE PAST 12 MONTHS, HOW MANY TIMES HAVE YOU MOVED WHERE YOU WERE LIVING?: 0

## 2025-06-18 SDOH — ECONOMIC STABILITY: INCOME INSECURITY
IN THE PAST 12 MONTHS HAS THE ELECTRIC, GAS, OIL, OR WATER COMPANY THREATENED TO SHUT OFF SERVICES IN YOUR HOME?: PATIENT UNABLE TO ANSWER

## 2025-06-18 SDOH — SOCIAL STABILITY: SOCIAL INSECURITY: WITHIN THE LAST YEAR, HAVE YOU BEEN AFRAID OF YOUR PARTNER OR EX-PARTNER?: PATIENT UNABLE TO ANSWER

## 2025-06-18 SDOH — SOCIAL STABILITY: SOCIAL INSECURITY
WITHIN THE LAST YEAR, HAVE YOU BEEN HUMILIATED OR EMOTIONALLY ABUSED IN OTHER WAYS BY YOUR PARTNER OR EX-PARTNER?: PATIENT UNABLE TO ANSWER

## 2025-06-18 SDOH — ECONOMIC STABILITY: FOOD INSECURITY
WITHIN THE PAST 12 MONTHS, THE FOOD YOU BOUGHT JUST DIDN'T LAST AND YOU DIDN'T HAVE MONEY TO GET MORE.: PATIENT UNABLE TO ANSWER

## 2025-06-18 SDOH — SOCIAL STABILITY: SOCIAL INSECURITY: DOES ANYONE TRY TO KEEP YOU FROM HAVING/CONTACTING OTHER FRIENDS OR DOING THINGS OUTSIDE YOUR HOME?: UNABLE TO ASSESS

## 2025-06-18 SDOH — ECONOMIC STABILITY: HOUSING INSECURITY
IN THE LAST 12 MONTHS, WAS THERE A TIME WHEN YOU WERE NOT ABLE TO PAY THE MORTGAGE OR RENT ON TIME?: PATIENT UNABLE TO ANSWER

## 2025-06-18 SDOH — SOCIAL STABILITY: SOCIAL INSECURITY: ARE YOU OR HAVE YOU BEEN THREATENED OR ABUSED PHYSICALLY, EMOTIONALLY, OR SEXUALLY BY ANYONE?: UNABLE TO ASSESS

## 2025-06-18 SDOH — SOCIAL STABILITY: SOCIAL INSECURITY: HAS ANYONE EVER THREATENED TO HURT YOUR FAMILY OR YOUR PETS?: UNABLE TO ASSESS

## 2025-06-18 SDOH — ECONOMIC STABILITY: HOUSING INSECURITY: AT ANY TIME IN THE PAST 12 MONTHS, WERE YOU HOMELESS OR LIVING IN A SHELTER (INCLUDING NOW)?: PATIENT UNABLE TO ANSWER

## 2025-06-18 SDOH — SOCIAL STABILITY: SOCIAL INSECURITY: DO YOU FEEL UNSAFE GOING BACK TO THE PLACE WHERE YOU ARE LIVING?: UNABLE TO ASSESS

## 2025-06-18 SDOH — ECONOMIC STABILITY: FOOD INSECURITY
HOW HARD IS IT FOR YOU TO PAY FOR THE VERY BASICS LIKE FOOD, HOUSING, MEDICAL CARE, AND HEATING?: PATIENT UNABLE TO ANSWER

## 2025-06-18 SDOH — ECONOMIC STABILITY: TRANSPORTATION INSECURITY
IN THE PAST 12 MONTHS, HAS LACK OF TRANSPORTATION KEPT YOU FROM MEDICAL APPOINTMENTS OR FROM GETTING MEDICATIONS?: PATIENT UNABLE TO ANSWER

## 2025-06-18 SDOH — SOCIAL STABILITY: SOCIAL INSECURITY
WITHIN THE LAST YEAR, HAVE YOU BEEN RAPED OR FORCED TO HAVE ANY KIND OF SEXUAL ACTIVITY BY YOUR PARTNER OR EX-PARTNER?: PATIENT UNABLE TO ANSWER

## 2025-06-18 SDOH — ECONOMIC STABILITY: FOOD INSECURITY
WITHIN THE PAST 12 MONTHS, YOU WORRIED THAT YOUR FOOD WOULD RUN OUT BEFORE YOU GOT THE MONEY TO BUY MORE.: PATIENT UNABLE TO ANSWER

## 2025-06-18 SDOH — SOCIAL STABILITY: SOCIAL INSECURITY: ARE THERE ANY APPARENT SIGNS OF INJURIES/BEHAVIORS THAT COULD BE RELATED TO ABUSE/NEGLECT?: UNABLE TO ASSESS

## 2025-06-18 SDOH — SOCIAL STABILITY: SOCIAL INSECURITY: HAVE YOU HAD ANY THOUGHTS OF HARMING ANYONE ELSE?: UNABLE TO ASSESS

## 2025-06-18 SDOH — SOCIAL STABILITY: SOCIAL INSECURITY: HAVE YOU HAD THOUGHTS OF HARMING ANYONE ELSE?: UNABLE TO ASSESS

## 2025-06-18 SDOH — SOCIAL STABILITY: SOCIAL INSECURITY: WERE YOU ABLE TO COMPLETE ALL THE BEHAVIORAL HEALTH SCREENINGS?: NO

## 2025-06-18 SDOH — SOCIAL STABILITY: SOCIAL INSECURITY: ABUSE: ADULT

## 2025-06-18 ASSESSMENT — COGNITIVE AND FUNCTIONAL STATUS - GENERAL
CLIMB 3 TO 5 STEPS WITH RAILING: TOTAL
DRESSING REGULAR LOWER BODY CLOTHING: A LOT
TURNING FROM BACK TO SIDE WHILE IN FLAT BAD: A LOT
DRESSING REGULAR UPPER BODY CLOTHING: A LOT
PERSONAL GROOMING: TOTAL
EATING MEALS: A LOT
MOBILITY SCORE: 6
DRESSING REGULAR UPPER BODY CLOTHING: TOTAL
TOILETING: A LOT
HELP NEEDED FOR BATHING: TOTAL
CLIMB 3 TO 5 STEPS WITH RAILING: TOTAL
MOVING TO AND FROM BED TO CHAIR: A LOT
STANDING UP FROM CHAIR USING ARMS: A LOT
DRESSING REGULAR LOWER BODY CLOTHING: TOTAL
TURNING FROM BACK TO SIDE WHILE IN FLAT BAD: TOTAL
PATIENT BASELINE BEDBOUND: YES
STANDING UP FROM CHAIR USING ARMS: TOTAL
WALKING IN HOSPITAL ROOM: TOTAL
TURNING FROM BACK TO SIDE WHILE IN FLAT BAD: TOTAL
DAILY ACTIVITIY SCORE: 6
DAILY ACTIVITIY SCORE: 12
MOVING FROM LYING ON BACK TO SITTING ON SIDE OF FLAT BED WITH BEDRAILS: TOTAL
MOVING TO AND FROM BED TO CHAIR: TOTAL
MOBILITY SCORE: 6
TOILETING: TOTAL
WALKING IN HOSPITAL ROOM: TOTAL
EATING MEALS: TOTAL
DRESSING REGULAR UPPER BODY CLOTHING: TOTAL
HELP NEEDED FOR BATHING: TOTAL
PERSONAL GROOMING: TOTAL
MOVING FROM LYING ON BACK TO SITTING ON SIDE OF FLAT BED WITH BEDRAILS: TOTAL
STANDING UP FROM CHAIR USING ARMS: TOTAL
EATING MEALS: TOTAL
HELP NEEDED FOR BATHING: A LOT
TOILETING: TOTAL
MOBILITY SCORE: 11
CLIMB 3 TO 5 STEPS WITH RAILING: TOTAL
WALKING IN HOSPITAL ROOM: A LOT
PERSONAL GROOMING: A LOT
MOVING TO AND FROM BED TO CHAIR: TOTAL
MOVING FROM LYING ON BACK TO SITTING ON SIDE OF FLAT BED WITH BEDRAILS: A LOT
DRESSING REGULAR LOWER BODY CLOTHING: TOTAL
DAILY ACTIVITIY SCORE: 6

## 2025-06-18 ASSESSMENT — PAIN - FUNCTIONAL ASSESSMENT
PAIN_FUNCTIONAL_ASSESSMENT: PAINAD (PAIN ASSESSMENT IN ADVANCED DEMENTIA SCALE)
PAIN_FUNCTIONAL_ASSESSMENT: WONG-BAKER FACES
PAIN_FUNCTIONAL_ASSESSMENT: WONG-BAKER FACES

## 2025-06-18 ASSESSMENT — ACTIVITIES OF DAILY LIVING (ADL)
HEARING - RIGHT EAR: FUNCTIONAL
ADEQUATE_TO_COMPLETE_ADL: NO
LACK_OF_TRANSPORTATION: PATIENT UNABLE TO ANSWER
ASSISTIVE_DEVICE: WHEELCHAIR
BATHING: DEPENDENT
JUDGMENT_ADEQUATE_SAFELY_COMPLETE_DAILY_ACTIVITIES: NO
LACK_OF_TRANSPORTATION: NO
LACK_OF_TRANSPORTATION: PATIENT UNABLE TO ANSWER
GROOMING: DEPENDENT
TOILETING: DEPENDENT
DRESSING YOURSELF: DEPENDENT
WALKS IN HOME: DEPENDENT
FEEDING YOURSELF: DEPENDENT
PATIENT'S MEMORY ADEQUATE TO SAFELY COMPLETE DAILY ACTIVITIES?: NO
HEARING - LEFT EAR: FUNCTIONAL

## 2025-06-18 ASSESSMENT — PAIN SCALES - PAIN ASSESSMENT IN ADVANCED DEMENTIA (PAINAD)
TOTALSCORE: NO NEED TO CONSOLE
CONSOLABILITY: NO NEED TO CONSOLE
BREATHING: NORMAL
NEGVOCALIZATION: OCCASIONAL MOAN/GROAN, LOW SPEECH, NEGATIVE/DISAPPROVING QUALITY
BREATHING: SMILING OR INEXPRESSIVE
TOTALSCORE: 0
BODYLANGUAGE: RELAXED
BREATHING: NORMAL
CONSOLABILITY: NO NEED TO CONSOLE
FACIALEXPRESSION: SMILING OR INEXPRESSIVE
BODYLANGUAGE: RELAXED
TOTALSCORE: 0
TOTALSCORE: REPOSITIONED
FACIALEXPRESSION: SMILING OR INEXPRESSIVE
TOTALSCORE: MEDICATION (SEE MAR);REPOSITIONED
TOTALSCORE: 6
BREATHING: NORMAL
BREATHING: NOISY LABORED BREATHING, LONG PERIODS OF HYPERVENTILATION, CHEYNE-STOKES RESPIRATIONS
FACIALEXPRESSION: FACIAL GRIMACING
TOTALSCORE: 0
BODYLANGUAGE: RELAXED
CONSOLABILITY: NO NEED TO CONSOLE
BODYLANGUAGE: TENSE, DISTRESSED PACING, FIDGETING

## 2025-06-18 ASSESSMENT — PATIENT HEALTH QUESTIONNAIRE - PHQ9
SUM OF ALL RESPONSES TO PHQ9 QUESTIONS 1 & 2: 0
1. LITTLE INTEREST OR PLEASURE IN DOING THINGS: NOT AT ALL
2. FEELING DOWN, DEPRESSED OR HOPELESS: NOT AT ALL

## 2025-06-18 ASSESSMENT — PAIN SCALES - WONG BAKER
WONGBAKER_NUMERICALRESPONSE: NO HURT
WONGBAKER_NUMERICALRESPONSE: NO HURT

## 2025-06-18 ASSESSMENT — ENCOUNTER SYMPTOMS
APPETITE CHANGE: 1
COUGH: 1
DIAPHORESIS: 1

## 2025-06-18 ASSESSMENT — PAIN SCALES - GENERAL
PAINLEVEL_OUTOF10: 0 - NO PAIN
PAINLEVEL_OUTOF10: 0 - NO PAIN

## 2025-06-18 ASSESSMENT — LIFESTYLE VARIABLES
AUDIT-C TOTAL SCORE: 0
HOW OFTEN DO YOU HAVE A DRINK CONTAINING ALCOHOL: NEVER
HOW OFTEN DO YOU HAVE 6 OR MORE DRINKS ON ONE OCCASION: NEVER
SKIP TO QUESTIONS 9-10: 1
AUDIT-C TOTAL SCORE: 0
HOW MANY STANDARD DRINKS CONTAINING ALCOHOL DO YOU HAVE ON A TYPICAL DAY: PATIENT DOES NOT DRINK

## 2025-06-18 NOTE — PROGRESS NOTES
Speech-Language Pathology    Speech-Language Pathology Clinical Swallow Evaluation    Patient Name: Rylan Garcia  MRN: [id number]  : 1945  Today's Date: 25  Start Time: 0850  Stop Time: 914  Time Calculation (min): 24 min    ASSESSMENT    Mod oral phase and suspected pharyngeal phase dysphagia based on clinical swallow evaluation.   Pt is at risk for insufficient oral nutrition due to waxing and waning mentation/ severity of dementia symptoms.  Silent aspiration cannot be ruled out without Modified barium swallow but the patient's wife is considering enrollment in Hospice services for further evaluation is not indicated at this time.   No further Speech Language Pathology intervention is indicated.   Prognosis: Fair    PLAN    Recommendations:    Is MBSS recommended? Not at this time  Solid consistency: Pureed (IDDSI level 4)  Liquid consistency: Mildly thick (IDDSI 2) / Nectar-thick  Medication administration: Crushed in puree    Compensatory swallow strategies:  - Upright positioning for all PO intake  - Slow rate of intake  - Small bites  - Small sips  - Spoon presentation of liquids  - 1:1 assist with oral intake    Discharge recommendation: return to facility without therapy     Strengths: Family/caregiver support  Barriers to participation in tx: Cognition and Anticipated fair or poor medical prognosis         SUBJECTIVE  PMHx relevant to rehab:   Rylan Garcia is a 80 y.o. male with PMHx significant for frontotemporal dementia (non-verbal at baseline) with dysphagia, subdural hematoma, HTN, BPH, and hypothyroidism admitted on 2025 with sepsis 2/2 PNA with concern for aspiration. Also, worsening lytic lesions noted on CT imaging concerning for possible multiple myeloma diagnosis.   Chief complaint: Pt was admitted on 25 due to   Chief Complaint   Patient presents with    Altered Mental Status    Aspiration     Pt being treated for pna, possible aspiration   . He was found to have  Pneumonia due to infectious organism, unspecified laterality, unspecified part of lung.    Relevant imaging results:  6/17/25 CT chest  IMPRESSION:  1. Small area of consolidation and adjacent pleural effusion right  lower lobe likely representing pneumonia.    General Visit Information:  SLP Received On: 06/18/25  Patient Class: Inpatient  Living Environment: Home  Ordering Physician: Christiano Bartlett DO  Reason for Referral: dysphagia  Prior to Session Communication: Bedside nurse    RN cleared pt to participate in session and reported that he has been sleeping.     Pt is nonverbal at his baseline. His wife at the bedside reported that he has been on a mechanical soft diet with nectar thick liquids since [facility]. Very recently the facility downgraded his diet to pureed with nectar when his mental status declined somewhat in the past week.    [employer] Diet: puree, nectar  Current Diet : NPO    Status at time of evaluation:  Pain Assessment  Pain Assessment: PAINAD (Pain Assessment in Advanced Dementia Scale)  0-10 (Numeric) Pain Score: 0 - No pain  Nguyen-Baker FACES Pain Rating: No hurt    Pt was eyes closed but attentive/ cooperative for oral trials of puree and IDDSI 2/nectar thick liquids for session.  Orientation: Did not correctly report any orientation information.    Ability to follow functional commands: Does not follow verbal commands  Nutritional status: Appears well-nourished/no concerns    Respiratory status: Supplemental oxygen via NC  Baseline Vocal Quality: Other (comment) (nonverbal)  Volitional Cough: Absent  Volitional Swallow: Other (Comment) (did not follow verbal instructions)  Patient positioning: Upright as close as possible to 90 degrees, but partially reclined     OBJECTIVE  Clinical swallow evaluation completed and consisted of interview (family assisted), limited oral motor assessment, oral care prior to PO trials, and PO trials (container  of apple sauce and 4 ounces of mildly thick liquids).    ORAL PHASE: Natural dentition in good condition. Oral mucosa were pink, moist, and free of obvious lesions. Lingual strength and ROM were generally intact. Labial strength/ROM were generally intact. Labial seal was adequate. Mastication of regular solids was not tested. A/P transit and oral clearance were slowed but adequate.    PHARYNGEAL PHASE: Laryngeal elevation was visualized or palpated with all trials, however adequacy of hyolaryngeal elevation/excursion cannot be determined at bedside. No immediate or delayed s/sx aspiration/penetration were observed with any consistencies.    Was 3oz challenge administered: N/A; pt on modified diet at baseline.       Inpatient Education:  Extensive education provided to spouse regarding current swallow function, recommendations/results, and POC.    Barriers to learning: None   Method of teaching: Verbal and written   Topic: role of ST, results of assessment, and recommended safe swallow strategies   Outcome of teaching: Meets goals/outcomes  Treatment provided: No

## 2025-06-18 NOTE — CONSULTS
Nutrition Note:   Nutrition Assessment       Patient is a 80 y.o. male presenting with sepsis 2/2 PNA with concern for aspiration. Lytic lesions noted on CT imaging additionally c/f multiple myeloma diagnosis.     Palliative care engaged & pt now being referred to hospice.     RDN received nursing admission screen (MST=2). Will defer full assessment given no nutritional intervention indicated w/ current GOC. Please re-consult should GOC change.       Dietary Orders (From admission, onward)       Start     Ordered    06/18/25 0920  Adult diet Regular; Pureed 4; Mild thick 2; 1:1 Feeding, No straw, Spoon feed only  Diet effective now        Question Answer Comment   Diet type Regular    Texture Pureed 4    Fluid consistency Mild thick 2    Select tray type: 1:1 Feeding    Select tray type: No straw    Select tray type: Spoon feed only        06/18/25 0919    06/18/25 0613  May Not Participate in Room Service  ( ROOM SERVICE MAY NOT PARTICIPATE)  Once        Question:  .  Answer:  Yes    06/18/25 0612             Time Spent (min): 15 minutes

## 2025-06-18 NOTE — ED PROCEDURE NOTE
Procedure  Critical Care    Performed by: Reyes Brownlee MD  Authorized by: Reyes Brownlee MD    Critical care provider statement:     Critical care time (minutes):  55    Critical care time was exclusive of:  Separately billable procedures and treating other patients and teaching time    Critical care was necessary to treat or prevent imminent or life-threatening deterioration of the following conditions:  Sepsis    Critical care was time spent personally by me on the following activities:  Ordering and performing treatments and interventions, ordering and review of laboratory studies, ordering and review of radiographic studies, pulse oximetry, re-evaluation of patient's condition, review of old charts, examination of patient, development of treatment plan with patient or surrogate, evaluation of patient's response to treatment and obtaining history from patient or surrogate    Care discussed with: admitting provider                 Reyes Brownlee MD  06/18/25 2223

## 2025-06-18 NOTE — PROGRESS NOTES
Pharmacy Medication History Review    Rylan Garcia is a 80 y.o. male admitted for Pneumonia due to infectious organism, unspecified laterality, unspecified part of lung. Pharmacy reviewed the patient's [id number] medications and allergies for accuracy.    The list below reflectives the updated PTA list. Please review each medication in order reconciliation for additional clarification and justification.  Prior to Admission Medications   Prescriptions Last Dose Informant Patient Reported? Taking?   OXcarbazepine (Trileptal) 300 mg tablet 6/17/2025 Morning  Yes Yes   Sig: Take 1 tablet (300 mg) by mouth 2 times a day.   alfuzosin (Uroxatral) 10 mg 24 hr tablet 6/17/2025 Morning  Yes Yes   Sig: Take 1 tablet (10 mg) by mouth once daily.   coenzyme Q-10 100 mg capsule 6/17/2025 Morning  Yes Yes   Sig: Take 1 capsule (100 mg) by mouth once daily.   docusate sodium (Colace) 100 mg capsule 6/17/2025 Morning  Yes Yes   Sig: Take 1 tablet by mouth once daily.   doxazosin (Cardura) 2 mg tablet Unknown  Yes No   Sig: Take 1 tablet (2 mg) by mouth once daily at bedtime.   finasteride (Proscar) 5 mg tablet 6/17/2025 Morning  Yes Yes   Sig: Take 1 tablet (5 mg) by mouth once daily.   glucosamine sulfate 1,000 mg capsule Unknown  Yes No   Sig: Take by mouth.   hydrOXYzine pamoate (Vistaril) 25 mg capsule 6/17/2025 Evening  Yes Yes   Sig: Take 1 capsule (25 mg) by mouth 2 times a day.   levothyroxine (Synthroid, Levoxyl) 25 mcg tablet 6/17/2025 Morning  Yes Yes   Sig: Take 1 tablet (25 mcg) by mouth once daily.   melatonin 5 mg tablet 6/17/2025 Evening  Yes Yes   Sig: Take 1 tablet (5 mg) by mouth as needed at bedtime.   multivitamin tablet 6/17/2025 Morning  Yes Yes   Sig: Take 1 tablet by mouth once daily.   omega 3-dha-epa-fish oil (Fish OiL) 360-1,200 mg capsule 6/17/2025 Morning  Yes Yes   Sig: Take by mouth.   sertraline (Zoloft) 25 mg tablet 6/17/2025 Morning  Yes Yes   Sig: Take 1 tablet (25 mg) by mouth once  daily.      Facility-Administered Medications: None            The list below reflectives the updated allergy list. Please review each documented allergy for additional clarification and justification.  Allergies  Reviewed by Reyes Sutherland on 6/18/2025        Severity Reactions Comments    Aricept [donepezil] Not Specified Other     Namenda [memantine] Not Specified Unknown             Below are additional concerns with the patient's PTA list.    Sertraline 50 was changed to 25 according to [place] Medlist.    Doxazosin 2mg added according to [place] Medlist    REYES SUTHERLAND

## 2025-06-18 NOTE — PROGRESS NOTES
06/18/25 1224   Discharge Planning   Living Arrangements Other (Comment)  (From [facility])   Support Systems Spouse/significant other   Assistance Needed Patient from [facility], history of dementia, per daughter minimally verbal, 2 person assist for transfers, no 02 baseline, pureed diet with nectar thickened liquids,   Type of Residence Nursing home/residential care   Do you have animals or pets at home? No   Who is requesting discharge planning? Provider   Home or Post Acute Services Post acute facilities (Rehab/SNF/etc)   Type of Post Acute Facility Services Long term care  (vs Hospice)   Expected Discharge Disposition Inter  (Patient from [facility], palliative care consulted, referral made to [facility], will follow for further d/c planning)   Does the patient need discharge transport arranged? Yes   RoundTrip coordination needed? Yes   Has discharge transport been arranged? No   Financial Resource Strain   How hard is it for you to pay for the very basics like food, housing, medical care, and heating? Not hard   Housing Stability   In the last 12 months, was there a time when you were not able to pay the mortgage or rent on time? N   In the past 12 months, how many times have you moved where you were living? 0   At any time in the past 12 months, were you homeless or living in a shelter (including now)? N   Transportation Needs   In the past 12 months, has lack of transportation kept you from medical appointments or from getting medications? no   In the past 12 months, has lack of transportation kept you from meetings, work, or from getting things needed for daily living? No   Patient Choice   Provider Choice list and CMS website (https://medicare.gov/care-compare#search) for post-acute Quality and Resource Measure Data were provided and reviewed with: Family   Patient / Family choosing to utilize agency / facility established prior to hospitalization Yes   Stroke Family Assessment   Stroke Family  Assessment Needed No   Intensity of Service   Intensity of Service 0-30 min

## 2025-06-18 NOTE — H&P
Internal Medicine - History and Physical Note      Patient: Rylan Garcia, Age: 80 y.o., SEX: male , MRN:[id number], [facility]OM[facility],  Code: DNR and No Intubation   Admitted On: 6/17/2025   Admitting Dx: No admission diagnoses are documented for this encounter.  PCP: David Hong MD        Attending: Ismael Andersen MD        Chief Complaint   Patient: Rylan Garcia is a 80 y.o. male who presented to the hospital for   Chief Complaint   Patient presents with    Altered Mental Status    Aspiration     Pt being treated for pna, possible aspiration       HPI   Rylan Garcia is a 80 y.o. year old male  patient with with PMHx significant for frontotemporal dementia (non-verbal at baseline) with dysphagia, subdural hematoma, HTN, BPH, and hypothyroidism who presented to [facility] on [facility]17 from nursing facility for tachycardia and altered mental status.  History obtained from wife as patient is nonverbal at baseline.  She states that patient developed worsening cough a couple months ago.  There was concern for aspiration and he was evaluated by SLP at the facility and diet was changed to easy to chew solids with nectar thick liquids and they noted some improvement.  However, last few days they have noted some behavioral changes stating that he often is closing his eyes, not smiling, eating less of his food than normal.  They then noticed that he has been becoming diaphoretic and noted to have tachycardia in the 120s throughout yesterday.  Today, tachycardia worsened with heart rate in the 140s and with borderline hypotension, so patient was brought to the ER.    ROS  Review of Systems   Constitutional:  Positive for appetite change and diaphoresis.   Respiratory:  Positive for cough.      -Cannot obtain full ROS due to patient mental status - limited ROS above obtained from wife       ED COURSE:   VS: Temperature 36.2, /91, heart rate 141, respiration 20, O2 sat 97% room air    Labs  - CBC: WBC 8.9, hemoglobin  13.1, hematocrit 39.2, platelets 260  - BMP: Sodium 135, potassium 4.1, chloride 105, bicarb 21, BUN 16, creatinine 0.75, glucose 120  - LFTs: Albumin 3.7, alk phos 101, AST 18, ALT 33, T. bili 0.4  - Lactate: 1.2  - VBGs: 7.43/37/41  - Troponin: 8, 8  - BNP: 258  - UA positive for: 1+ protein  - Blood cultures: Pending    Imaging:  EKG  Sinus tachycardia ventricular rate 140    CXR  1.  Pulmonary vascular congestion suggests volume overload, with suspected acute pulmonary interstitial edema/CHF. No focal  consolidation, sizeable pleural effusion, or pneumothorax.  2. New abnormal right paratracheal opacity, suspicious for mass or lymphadenopathy. Focal pneumonia is also possible but thought somewhat less likely. Further evaluation with chest CT recommended.    CT Head  -No acute intracranial abnormality.  -Redemonstrated chronic subdural fluid collection deep to the right craniotomy and overlying the right frontal lobe which is decreased in sized/thickness compared to prior study, previously measuring up to 1.1 cm, now approximately 0.6 cm.  -Severe supratentorial volume loss similar to prior study.    CT C/A/P  1. Small area of consolidation and adjacent pleural effusion right lower lobe likely representing pneumonia.  2. Numerous lytic lesions within the axial skeleton, many which are larger compared to 06/13/2023 consistent with multiple myeloma. These are seen in the posterior right 5th rib, T5 and T11 vertebral bodies, L3 spinous process, and left annmarie sacrum at S1.  3. Moderate cardiomegaly with small pericardial effusion.  4. No acute process in the abdomen or pelvis.  5. 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    Interventions:   -2 L normal saline  -Vancomycin and Zosyn  -5 mg Cardizem IV  -125 mcg digoxin IV    Past Medical History:   Past Surgical History: Cataract surgery, hernia repair   Allergies: Aricept and Namenda   Family History: Non-contributory   Home Meds: Reviewed      Social History:  - Coming from nursing facility   - Tobacco: Former - quit   - Alcohol: None  - Illicit Drug: None    HealthCare Providers:  - PCP: David Hong MD     Code Status: DNR and No Intubation     Emergency contact: Extended Emergency Contact Information  Primary Emergency Contact: Flor Garcia  Address: [address] [place] [address] [place] [address] [facility] [place]  Home Phone: [phone]  Mobile Phone: [phone]  Relation: Spouse  Secondary Emergency Contact: Christina Garcia  Home Phone: [phone]  Relation: None     Past Medical History   Medical History[1]     Surgical History   Surgical History[2]    Family History   Family History[3]    Social History     Social History     Socioeconomic History    Marital status:      Spouse name: Not on file    Number of children: Not on file    Years of education: Not on file    Highest education level: Not on file   Occupational History    Not on file   Tobacco Use    Smoking status: Former     Current packs/day: 0.00     Types: Cigarettes     Start date: 1965     Quit date: 1975     Years since quittin.4    Smokeless tobacco: Never   Substance and Sexual Activity    Alcohol use: Not Currently    Drug use: Never    Sexual activity: Not on file   Other Topics Concern    Not on file   Social History Narrative    Not on file     Social Drivers of Health     Financial Resource Strain: Not on file   Food Insecurity: High Risk (2025)    Received from [facility] SDOH Screening     Summarize member's perception of social determinants of health including living situation, food insecurity, financial needs, transportation, learning, and technology.  identify barriers to include ...: Member resides in long-term care facility with housing and food needs met by facility and insuran...     Does the member feel like he/she gets enough food most days?: Yes   Transportation Needs: Not on file   Physical Activity:  "Not on file   Stress: Not on file   Social Connections: Not on file   Intimate Partner Violence: Not on file   Housing Stability: Not on file       Tobacco Use: Medium Risk (6/17/2025)    Patient History     Smoking Tobacco Use: Former     Smokeless Tobacco Use: Never     Passive Exposure: Not on file        Social History     Substance and Sexual Activity   Alcohol Use Not Currently        Allergies   RX Allergies[4]       Meds    Scheduled medications  Scheduled Medications[5]  Continuous medications  Continuous Medications[6]  PRN medications  PRN Medications[7]     Objective    Physical Exam  Constitutional:       General: He is not in acute distress.     Appearance: He is ill-appearing.      Comments: Patient with eyes closed on examination - non-verbal which is baseline and not responsive on exam   Cardiovascular:      Rate and Rhythm: Regular rhythm. Tachycardia present.   Pulmonary:      Effort: No respiratory distress.      Breath sounds: Wheezing and rales present.   Abdominal:      General: Bowel sounds are normal.      Tenderness: There is no abdominal tenderness.   Musculoskeletal:      Right lower leg: No edema.      Left lower leg: No edema.   Skin:     General: Skin is warm and dry.          Visit Vitals  BP 84/68   Pulse (!) 119   Temp 36.2 °C (97.2 °F) (Temporal)   Resp 19   Ht 1.727 m (5' 8\")   Wt 67.3 kg (148 lb 5.9 oz)   SpO2 97%   BMI 22.56 kg/m²   Smoking Status Former   BSA 1.8 m²          Intake/Output Summary (Last 24 hours) at 6/18/2025 0400  Last data filed at 6/18/2025 0030  Gross per 24 hour   Intake 1000 ml   Output --   Net 1000 ml             Labs:   Results from last 72 hours   Lab Units 06/17/25  2230   SODIUM mmol/L 135*   POTASSIUM mmol/L 4.1   CHLORIDE mmol/L 105   CO2 mmol/L 21   BUN mg/dL 16   CREATININE mg/dL 0.75   GLUCOSE mg/dL 120*   CALCIUM mg/dL 8.6   ANION GAP mmol/L 13   EGFR mL/min/1.73m*2 >90      Results from last 72 hours   Lab Units 06/17/25  2230   WBC AUTO " "x10*3/uL 8.9   HEMOGLOBIN g/dL 13.1*   HEMATOCRIT % 39.2*   PLATELETS AUTO x10*3/uL 260   NEUTROS PCT AUTO % 75.5   LYMPHS PCT AUTO % 16.5   MONOS PCT AUTO % 7.0   EOS PCT AUTO % 0.4      Lab Results   Component Value Date    CALCIUM 8.6 06/17/2025    PHOS 4.3 03/18/2023      No results found for: \"CRP\"    [unfilled]     Micro/ID:   No results found for the last 90 days.    Lab Results   Component Value Date    URINECULTURE NO GROWTH 03/17/2023                    No lab exists for component: \"AGALPCRNB\"       Images        No results found for this or any previous visit (from the past 4464 hours).   No results found for this or any previous visit (from the past 4464 hours).     [unfilled]     CT chest abdomen pelvis wo IV contrast  Result Date: 6/18/2025  1. Small area of consolidation and adjacent pleural effusion right lower lobe likely representing pneumonia.   2. Numerous lytic lesions within the axial skeleton, many which are larger compared to 06/13/2023 consistent with multiple myeloma. These are seen in the posterior right 5th rib, T5 and T11 vertebral bodies, L3 spinous process, and left annmarie sacrum at S1.   3. Moderate cardiomegaly with small pericardial effusion.   4. No acute process in the abdomen or pelvis.   5. 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 Do not see any workup on the multiple myeloma in the chart which is progressed from prior study in 2023.   MACRO: Kurt Salinas discussed the significance and urgency of this critical finding by EPIC secure chat with  AHSAN SHAH on 6/18/2025 at 1:03 am.  (**-RCF-**) Findings:  See findings.   Signed by: Kurt Salinas 6/18/2025 1:03 AM Dictation workstation:   [id number]    CT head wo IV contrast  Result Date: 6/18/2025  No acute intracranial abnormality.   Redemonstrated chronic subdural fluid collection deep to the right craniotomy and overlying the right frontal lobe which is decreased in sized/thickness " compared to prior study, previously measuring up to 1.1 cm, now approximately 0.6 cm.   Severe supratentorial volume loss similar to prior study.   MACRO: None.   Signed by: Kurt Salinas 6/18/2025 12:28 AM Dictation workstation:   [id number]    XR chest 1 view  Result Date: 6/17/2025  1.  Pulmonary vascular congestion suggests volume overload, with suspected acute pulmonary interstitial edema/CHF. No focal consolidation, sizeable pleural effusion, or pneumothorax. 2. New abnormal right paratracheal opacity, suspicious for mass or lymphadenopathy. Focal pneumonia is also possible but thought somewhat less likely. Further evaluation with chest CT recommended.       MACRO: None   Signed by: Chaz Cox 6/17/2025 11:46 PM Dictation workstation:   [id number]        Assessment and Plan    Rylan Garcia is a 80 y.o. male with PMHx significant for frontotemporal dementia (non-verbal at baseline) with dysphagia, subdural hematoma, HTN, BPH, and hypothyroidism admitted on 6/17/2025 with sepsis 2/2 PNA with concern for aspiration. Also, worsening lytic lesions noted on CT imaging concerning for possible multiple myeloma diagnosis.    ACUTE MEDICAL ISSUES:  #Sepsis 2/2 pneumonia - concern for aspiration  #Acute metabolic encephalopathy 2/2 sepsis  ::CT chest with small area of consolidation and adjacent pleural effusion in R lower lobe consistent with PNA  ::S/p vancomycin and zosyn in the ED  ::S/p 2L NS in the ED   PLAN:  -Ceftriaxone and azithromycin  -Blood cultures ordered and pending   -Urine legionella and strep antigens ordered and pending  -Respiratory cultures ordered and pending  -DuoNebs PRN for wheezing  -Patient placed on 2L NC in ED but without desaturation - wean O2 as able     #Tachycardia   #PACs  ::HR persistently in 140s - appears to be sinus tachycardia on EKG  ::S/p diltiazem and digoxin push in the ED with no improvement - suspect tachycardia 2/2 sepsis   ::Patient evaluated by EP in the past who  stated NO paroxysmal Afib but rather PACs  PLAN:  -Monitor patient on telemetry  -Expect improvement of HR with IVF and treatment of infection  -Consider further rhythm control if no improvement     #Axial skeleton lytic lesions - concern for multiple myeloma  ::Wife aware - open to discussing hospice given progressive dementia and possible new cancer diagnosis   PLAN:  -SPEP ordered  -Palliative care consulted, appreciate recs    #Hx dysphagia with concern for aspiration  ::Currently on modified diet in nursing facility; soft solids and nectar thick liquids  ::Suspect dysphagia likely progressing with progressive nature of dementia   PLAN:  -SLP consulted for evaluation  -NPO pending SLP eval   -Palliative consulted as above, appreciate recs    CHRONIC MEDICAL ISSUES:  #BPH - continue home finasteride 5 mg , HOLD alfuzosin 10 mg in setting of soft BP  #MDD - continue home sertraline 50 mg  #Hypothyroidism - continue home levothyroxine 25 mcg daily   #Eczema #Hyperkeratosis - continue home ointments     Fluids: S/p 2L NS in the ED  Electrolytes: Replete PRN  Nutrition: NPO for SLP eval  Antimicrobials:   DVT ppx: SCDs - no chemical in setting of hx of SDH  GI ppx: None   Catheter: None  Lines: PIV  Supplemental Oxygen: 2L NC   Emergency Contact: Extended Emergency Contact Information  Primary Emergency Contact: Flor Garcia  Address: [address] [place] [address] [address], [facility]  Home Phone: [phone]  Mobile Phone: [phone]  Relation: Spouse  Secondary Emergency Contact: Christina Garcia  Home Phone: [phone]  Relation: None   Code: DNR and No Intubation     Disposition: 80 year old male admitted with sepsis 2/2 PNA with concern for aspiration and concern for new multiple myeloma diagnosis. ELOS > 2 midnights.    Libby Dunn MD  Internal Medicine, PGY- 1  06/18/25 at 4:00 AM               [1]   Past Medical History:  Diagnosis Date    Other specified abnormal findings of blood  chemistry     Blood cholesterol increased compared with prior measurement    Other specified postprocedural states     Status post hernia repair    Unspecified cataract     Cataracts, bilateral   [2]   Past Surgical History:  Procedure Laterality Date    CATARACT EXTRACTION  09/06/2018    Cataract Extraction    HERNIA REPAIR  09/06/2018    Hernia Repair   [3]   Family History  Problem Relation Name Age of Onset    No Known Problems Mother      No Known Problems Father     [4]   Allergies  Allergen Reactions    Aricept [Donepezil] Other    Namenda [Memantine] Unknown   [5] [6] [7]

## 2025-06-18 NOTE — ED TRIAGE NOTES
Pt being treated for pna and possible aspiration. Found altered more than his baseline by facility staff. Pt found to be tachycardic and  hypotensive. Sent in to ED for possible aspiration

## 2025-06-18 NOTE — PROGRESS NOTES
"  Department of Hospital Medicine  Progress Note    Subjective     Rylan Garcia is a 80 y.o. male on Hospital Day: 2 of admission presenting with Pneumonia due to infectious organism, unspecified laterality, unspecified part of lung.    Overnight Events: No overnight events    He is nonverbal at baseline this morning. Wife updated at bedside. SLP evaluated the patient and found that he can tolerate pureed diet with mildly thick / nectar-thick liquids. Given patient's multiple co-morbidities, palliative care was engaged. Will meet with hospice tomorrow.     Objective   Vitals:  Vitals:    06/18/25 0552 06/18/25 0826 06/18/25 0840 06/18/25 1218   BP:  124/82  103/74   BP Location:  Right arm  Right arm   Patient Position:  Lying  Lying   Pulse: (!) 141 (!) 140  (!) 144   Resp:    20   Temp: 36.2 °C (97.2 °F) 36.4 °C (97.5 °F)  36.1 °C (97 °F)   TempSrc: Temporal Temporal  Temporal   SpO2: 98%  98%    Weight: 71.6 kg (157 lb 12.8 oz)      Height: 1.676 m (5' 6\")          Intake/Output last 3 Shifts:  I/O last 3 completed shifts:  In: 1000 (14 mL/kg) [IV Piggyback:1000]  Out: 200 (2.8 mL/kg) [Urine:200 (0.1 mL/kg/hr)]  Weight: 71.6 kg   I/O this shift:  In: 606.7 [P.O.:30; I.V.:276.7; IV Piggyback:300]  Out: 300 [Urine:300]    Last stool output  Last BM Date: 06/18/25    Physical Exam:   Physical Exam  Vitals reviewed.   Cardiovascular:      Rate and Rhythm: Regular rhythm. Tachycardia present.      Heart sounds: Normal heart sounds. No murmur heard.     No gallop.   Pulmonary:      Effort: No respiratory distress.      Breath sounds: Normal breath sounds.   Abdominal:      General: Abdomen is flat. There is no distension.      Palpations: Abdomen is soft.      Tenderness: There is no abdominal tenderness.   Musculoskeletal:      Right lower leg: No edema.      Left lower leg: No edema.      Comments: Arms contracted   Skin:     General: Skin is warm and dry.   Neurological:      Mental Status: Mental status is at " "baseline.   Psychiatric:         Mood and Affect: Mood normal.         Behavior: Behavior normal.        Vent settings (if applicable):        LABS:    CBC:  Results from last 72 hours   Lab Units 06/18/25  0600 06/17/25  2230   WBC AUTO x10*3/uL 7.4 8.9   RBC AUTO x10*6/uL 3.77* 4.01*   HEMOGLOBIN g/dL 12.1* 13.1*   HEMATOCRIT % 36.0* 39.2*   MCV fL 96 98   MCH pg 32.1 32.7   MCHC g/dL 33.6 33.4   RDW % 13.2 13.2   PLATELETS AUTO x10*3/uL 235 260     CMP:  Results from last 72 hours   Lab Units 06/18/25  0600 06/17/25  2230   SODIUM mmol/L 137 135*   POTASSIUM mmol/L 3.6 4.1   CHLORIDE mmol/L 107 105   CO2 mmol/L 23 21   ANION GAP mmol/L 11 13   BUN mg/dL 12 16   CREATININE mg/dL 0.70 0.75   EGFR mL/min/1.73m*2 >90 >90   GLUCOSE mg/dL 103* 120*     Results from last 72 hours   Lab Units 06/18/25  0600 06/17/25  2230   ALBUMIN g/dL 3.3* 3.7   ALK PHOS U/L  --  101   ALT U/L  --  33   AST U/L  --  18   BILIRUBIN TOTAL mg/dL  --  0.4     Calcium/Phos:   Results from last 72 hours   Lab Units 06/18/25  0600 06/17/25  2230   CALCIUM mg/dL 8.2* 8.6   PHOSPHORUS mg/dL 3.6  --       COAG:     CRP: No results found for: \"CRP\"    ENDO:  Recent Labs     04/28/22  1515 10/08/21  1042 10/01/20  1220 07/20/20  1116 01/10/19  1326 05/22/18  1011   TSH 2.53 2.29 2.85 2.10   < >  --    HGBA1C  --   --   --   --   --  5.7    < > = values in this interval not displayed.      CARDIAC:   Results from last 72 hours   Lab Units 06/17/25  2318 06/17/25  2230   TROPHS ng/L 8 8   BNP pg/mL  --  258*       No data recorded    MICRO/ID:   No results found for the last 90 days.                   No lab exists for component: \"AGALPCRNB\"   Lab Results   Component Value Date    URINECULTURE NO GROWTH 03/17/2023    BLOODCULT Loaded on Instrument - Culture in progress 06/17/2025    BLOODCULT Loaded on Instrument - Culture in progress 06/17/2025       IMAGING RESULTS:   CT chest abdomen pelvis wo IV contrast  Result Date: 6/18/2025  1. Small area of " consolidation and adjacent pleural effusion right lower lobe likely representing pneumonia.   2. Numerous lytic lesions within the axial skeleton, many which are larger compared to 06/13/2023 consistent with multiple myeloma. These are seen in the posterior right 5th rib, T5 and T11 vertebral bodies, L3 spinous process, and left annmarie sacrum at S1.   3. Moderate cardiomegaly with small pericardial effusion.   4. No acute process in the abdomen or pelvis.   5. 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 Do not see any workup on the multiple myeloma in the chart which is progressed from prior study in 2023.   MACRO: Kurt Salinas discussed the significance and urgency of this critical finding by EPIC secure chat with  AHSAN SHAH on 6/18/2025 at 1:03 am.  (**-RCF-**) Findings:  See findings.   Signed by: Kurt Salinas 6/18/2025 1:03 AM Dictation workstation:   [id number]    CT head wo IV contrast  Result Date: 6/18/2025  No acute intracranial abnormality.   Redemonstrated chronic subdural fluid collection deep to the right craniotomy and overlying the right frontal lobe which is decreased in sized/thickness compared to prior study, previously measuring up to 1.1 cm, now approximately 0.6 cm.   Severe supratentorial volume loss similar to prior study.   MACRO: None.   Signed by: Kurt Salinas 6/18/2025 12:28 AM Dictation workstation:   [id number]    XR chest 1 view  Result Date: 6/17/2025  1.  Pulmonary vascular congestion suggests volume overload, with suspected acute pulmonary interstitial edema/CHF. No focal consolidation, sizeable pleural effusion, or pneumothorax. 2. New abnormal right paratracheal opacity, suspicious for mass or lymphadenopathy. Focal pneumonia is also possible but thought somewhat less likely. Further evaluation with chest CT recommended.       MACRO: None   Signed by: Chaz Cox 6/17/2025 11:46 PM Dictation workstation:   [id number]      ALLERGIES PAST  MEDICAL HISTORY PAST SURGICAL SURGERY FAMILY HISTORY SOCIAL HISTORY   Allergies[1] Medical History[2] Surgical History[3] Family History[4] Social History[5]         MEDS:  HOME MEDS SCHEDULED MEDS PRN IV MEDS   Current Outpatient Medications   Medication Instructions    alfuzosin (Uroxatral) 10 mg 24 hr tablet 1 tablet, Daily    coenzyme Q-10 100 mg, Daily    docusate sodium (Colace) 100 mg capsule 1 tablet, Daily    doxazosin (CARDURA) 2 mg, oral, Nightly    finasteride (PROSCAR) 5 mg, Daily    glucosamine sulfate 1,000 mg capsule oral    hydrOXYzine pamoate (VISTARIL) 25 mg, 2 times daily    levothyroxine (Synthroid, Levoxyl) 25 mcg tablet 1 tablet, Daily    melatonin 5 mg, Nightly PRN    multivitamin tablet 1 tablet, Daily    omega 3-dha-epa-fish oil (Fish OiL) 360-1,200 mg capsule Take by mouth.    OXcarbazepine (Trileptal) 300 mg tablet 1 tablet, 2 times daily    sertraline (ZOLOFT) 25 mg, oral, Daily    Scheduled Medications[6] PRN Medications[7] Continuous Medications[8]          Assessment/Plan   ASSESSMENT & PLAN  Rylan Garcia is a 80 y.o. male with PMHx significant for frontotemporal dementia (non-verbal at baseline) with dysphagia, subdural hematoma, HTN, BPH, and hypothyroidism admitted on 6/17/2025 with sepsis 2/2 PNA with concern for aspiration. Also, worsening lytic lesions noted on CT imaging concerning for possible multiple myeloma diagnosis.     ACUTE MEDICAL ISSUES  #Sepsis 2/2 pneumonia - concern for HAP vs aspiration pneumonia   #Acute metabolic encephalopathy 2/2 sepsis  ::CT chest with small area of consolidation and adjacent pleural effusion in R lower lobe consistent with PNA  ::S/p vancomycin and zosyn in the ED  ::S/p 2L NS in the ED   PLAN:  -will transition Ceftriaxone to Zosyn due to risk factors of living in nursing home  -cont azithromycin  -Blood cultures ordered and pending   -Urine legionella and strep antigens ordered and pending  -Respiratory cultures ordered and  pending  -DuoNebs PRN for wheezing  -Patient placed on 2L NC in ED but without desaturation - wean O2 as able      #Tachycardia   #PACs  ::HR persistently in 140s - appears to be sinus tachycardia on EKG  ::S/p diltiazem and digoxin push in the ED with no improvement - suspect tachycardia 2/2 sepsis   ::Patient evaluated by EP in the past who stated NO paroxysmal Afib but rather PACs  PLAN:  - Monitor patient on telemetry  - s/p 1x dose of 40mg IV lasix   - pain meds ordered - no improvement to HR   - will start metoprolol IV pushes to rate control      #Axial skeleton lytic lesions - concern for multiple myeloma  ::Wife aware - open to discussing hospice given progressive dementia and possible new cancer diagnosis  - Differentials include malignant bone tumors, possible prostate cancer, multiple myeloma (though labs not very consistent currently)  PLAN:  -SPEP/UPEP ordered  -Palliative care consulted - will plan for hospice discussion tomorrow     #Hx dysphagia with concern for aspiration  ::Currently on modified diet in nursing facility; soft solids and nectar thick liquids  ::Suspect dysphagia likely progressing with progressive nature of dementia   PLAN:  -SLP consulted - OK for pureed diet, mildly / nectar-thick liquid  -Palliative consulted as above, appreciate recs    CHRONIC MEDICAL ISSUES:  #BPH - continue home finasteride 5 mg , HOLD alfuzosin 10 mg in setting of soft BP  #MDD - continue home sertraline 50 mg  #Hypothyroidism - continue home levothyroxine 25 mcg daily   #Eczema #Hyperkeratosis - continue home ointments     NUTRITION STATUS:  Will hold consult at this time due to palliative consult.       Fluids: PRN  Electrolytes: Replete PRN  Nutrition: pureed diet, mildly / nectar-thick liquid  GI Prophylaxis: None  DVT Prophylaxis: SCD, Reason: prolonged stay in bed  Last Bowel Movement: Last BM Date: 06/18/25    Access: 2x PIV  Antibiotics: Azithromycin / Zosyn  Oxygenation: Room Air    Emergency  Contact: Extended Emergency Contact Information  Primary Emergency Contact: Flor Garcia  Address: [address] [place] [address] [place] [address] [place]  Home Phone: [phone]  Mobile Phone: [phone]  Relation: Spouse  Secondary Emergency Contact: Christina Garcia  Home Phone: [phone]  Relation: None    Dispo: Pending hospice meeting tomorrow.     Yunior Willson DO  Internal Medicine, PGY-II       [1]   Allergies  Allergen Reactions    Aricept [Donepezil] Other    Namenda [Memantine] Unknown   [2]   Past Medical History:  Diagnosis Date    Other specified abnormal findings of blood chemistry     Blood cholesterol increased compared with prior measurement    Other specified postprocedural states     Status post hernia repair    Unspecified cataract     Cataracts, bilateral   [3]   Past Surgical History:  Procedure Laterality Date    CATARACT EXTRACTION  2018    Cataract Extraction    HERNIA REPAIR  2018    Hernia Repair   [4]   Family History  Problem Relation Name Age of Onset    No Known Problems Mother      No Known Problems Father     [5]   Social History  Tobacco Use    Smoking status: Former     Current packs/day: 0.00     Types: Cigarettes     Start date: 1965     Quit date: 1975     Years since quittin.4    Smokeless tobacco: Never   Substance Use Topics    Alcohol use: Not Currently    Drug use: Never   [6] [Held by provider] alfuzosin, 10 mg, oral, Daily  azithromycin, 500 mg, intravenous, q24h  cefTRIAXone, 1 g, intravenous, q24h  docusate sodium, 100 mg, oral, Daily  [Held by provider] doxazosin, 2 mg, oral, Nightly  finasteride, 5 mg, oral, Daily  levothyroxine, 25 mcg, oral, Daily  melatonin, 3 mg, oral, Nightly  metoprolol, 5 mg, intravenous, Once  OXcarbazepine, 300 mg, oral, BID  sertraline, 25 mg, oral, Daily  [7] PRN medications: acetaminophen **OR** acetaminophen, ipratropium-albuteroL, morphine, morphine  [8] [Held by provider] lactated Ringer's, 50  mL/hr, Last Rate: Stopped (06/18/25 1205)

## 2025-06-18 NOTE — CARE PLAN
The patient's goals for the shift include  MILADIS    The clinical goals for the shift include patient will have HR WNL      Problem: Pain - Adult  Goal: Verbalizes/displays adequate comfort level or baseline comfort level  Outcome: Progressing     Problem: Safety - Adult  Goal: Free from fall injury  Outcome: Progressing     Problem: Discharge Planning  Goal: Discharge to home or other facility with appropriate resources  Outcome: Progressing     Problem: Chronic Conditions and Co-morbidities  Goal: Patient's chronic conditions and co-morbidity symptoms are monitored and maintained or improved  Outcome: Progressing     Problem: Nutrition  Goal: Nutrient intake appropriate for maintaining nutritional needs  Outcome: Progressing     Problem: Skin  Goal: Decreased wound size/increased tissue granulation at next dressing change  Outcome: Progressing  Flowsheets (Taken 6/18/2025 1247)  Decreased wound size/increased tissue granulation at next dressing change: Promote sleep for wound healing  Goal: Participates in plan/prevention/treatment measures  Outcome: Progressing  Flowsheets (Taken 6/18/2025 1247)  Participates in plan/prevention/treatment measures: Elevate heels  Goal: Prevent/manage excess moisture  Outcome: Progressing  Flowsheets (Taken 6/18/2025 1247)  Prevent/manage excess moisture:   Monitor for/manage infection if present   Cleanse incontinence/protect with barrier cream   Moisturize dry skin  Goal: Prevent/minimize sheer/friction injuries  Outcome: Progressing  Flowsheets (Taken 6/18/2025 1247)  Prevent/minimize sheer/friction injuries:   Use pull sheet   HOB 30 degrees or less   Turn/reposition every 2 hours/use positioning/transfer devices  Goal: Promote/optimize nutrition  Outcome: Progressing  Flowsheets (Taken 6/18/2025 1247)  Promote/optimize nutrition:   Assist with feeding   Monitor/record intake including meals   Consume > 50% meals/supplements   Offer water/supplements/favorite foods  Goal: Promote  skin healing  Outcome: Progressing  Flowsheets (Taken 6/18/2025 1247)  Promote skin healing:   Turn/reposition every 2 hours/use positioning/transfer devices   Protective dressings over bony prominences

## 2025-06-18 NOTE — ED PROVIDER NOTES
HPI   Chief Complaint   Patient presents with   • Altered Mental Status   • Aspiration     Pt being treated for pna, possible aspiration       Rylan is an 80-year-old male who is nonverbal and has been at the nursing home treating for possible aspiration they changed his food and he has had less problems.  He has had rapid heart rate last few days been 120s 130 and now today is close to 140.  He normally smiles and is not able to do that according to his wife.  He has history of previous stroke and dementia.  He is DO NOT RESUSCITATE comfort care.  EMS reports he has had some wheezing.  He is not running a fever and has been awake.  History is very limited from patient mostly comes from his wife.               Patient History   Medical History[1]  Surgical History[2]  Family History[3]  Social History[4]    Physical Exam   ED Triage Vitals [06/17/25 2221]   Temperature Heart Rate Respirations BP   36.2 °C (97.2 °F) (!) 141 20 (!) 109/91      Pulse Ox Temp Source Heart Rate Source Patient Position   97 % Temporal Monitor Lying      BP Location FiO2 (%)     Left arm --       Physical Exam  Vitals reviewed.   Constitutional:       General: He is awake.      Appearance: Normal appearance.   HENT:      Head: Normocephalic.      Nose: Nose normal.   Cardiovascular:      Rate and Rhythm: Regular rhythm. Tachycardia present.   Pulmonary:      Effort: Pulmonary effort is normal.      Comments: Some expiratory wheezing appreciated  Abdominal:      Palpations: Abdomen is soft.   Musculoskeletal:      Cervical back: Normal range of motion.   Skin:     General: Skin is warm.      Capillary Refill: Capillary refill takes less than 2 seconds.   Neurological:      Comments: Eyes are open patient looks around at times does not follow commands well.    Patient is able move his upper extremities           ED Course & [facility]   ED Course as of 06/18/25 1403   Tue Jun 17, 202517, 2025 2234 EKG done at 2218 interpreted by me shows sinus  tachycardia rate of 140 bpm study with nonspecific ST abnormality.  Old EKG October 27, 2023 is normal sinus no STEMI [RZ]   2243 Paperwork shows DNR Comfort Care arrest [RZ]   Wed Jun 18, 2025   1401 Patient remained tachycardic in spite of getting Cardizem which dropped his pressure little bit and then he tried some digoxin.  His heart rate did slow down a little bit but nothing appreciable.  He is in no acute distress.  Talk to patient family about difficulty controlling his pressure and goals of treatment.  Concerned that these lytic lesions are multiple myeloma was discussed with the patient's family as well.  This was the interpretation based on the radiology readings.  I spoke to the patient's wife and she was considering changing his CODE STATUS from DO NOT RESUSCITATE comfort care arrest and is considering possible hospice.  This will need to be further explored with patient's family.  I notified the hospitalist and patient was hospitalized [RZ]      ED Course User Index  [RZ] Reyes Brownlee MD         Diagnoses as of 06/18/25 1403   Pneumonia due to infectious organism, unspecified laterality, unspecified part of lung   Lytic bone lesions on xray   Tachycardia                 No data recorded                                 Medical Decision Making      Procedure  Procedures         [1]  Past Medical History:  Diagnosis Date   • Other specified abnormal findings of blood chemistry     Blood cholesterol increased compared with prior measurement   • Other specified postprocedural states     Status post hernia repair   • Unspecified cataract     Cataracts, bilateral   [2]  Past Surgical History:  Procedure Laterality Date   • CATARACT EXTRACTION  09/06/2018    Cataract Extraction   • HERNIA REPAIR  09/06/2018    Hernia Repair   [3]  Family History  Problem Relation Name Age of Onset   • No Known Problems Mother     • No Known Problems Father     [4]  Social History  Tobacco Use   • Smoking status: Former      Current packs/day: 0.00     Types: Cigarettes     Start date: 1965     Quit date: 1975     Years since quittin.4   • Smokeless tobacco: Never   Substance Use Topics   • Alcohol use: Not Currently   • Drug use: Never      Reyes Brownlee MD  25 1407

## 2025-06-18 NOTE — CONSULTS
Consults    PALLIATIVE CARE SOCIAL WORK CONSULT:  Colleen ARIAS  CURRENT ATTENDING PROVIDER: Ismael Andersen MD     Reason for Consult    GOC    Introduction to Palliative Care  Met with pt and spouse at bedside  Pt was not alert and unable to participate in visit.  Staff present:  Colleen Mata  Palliative care was introduced as a service for patients with serious illness to help with symptoms, assist with goals of care conversations, navigate complex decision making, improve quality of life for patients, and provide support to patients and families.  Support and empathy was provided throughout the encounter.    History of Present Illness  Rylan Garcia is a 80 y.o. male with past medical history of dementia with dysphagia, subdural hematoma, HTN, BPH. Pt is presenting with tachycardia and altered mental status    Caregiving/Caregiver Support  Does the patient require assistance in some or all components of his care, including coordination of medical care?  yes  If Yes, which person serves that role?   [facility]     Medical History  Per EMR.  Pt was diagnosed with dementia ~eight years ago.  Pt has been in NH for three years.     Personal History  Pt has been  for 29 years.  Pt has three dtrs, spouse has no children.  All local.  Pt is a [employer].  Pt worked as a , , and  in a school.  Pt was a marathon runner.     Yazdanism and Importance of Yazdanism:  Orthodox, not active.     Depression   no per spouse, although spouse has noticed tears in pt's eyes.         Anxiety    no per spouse     Advance Directives  Surrogate Health Care Decision Maker:  spouse  HCPOA   yes  copies requested    Code Status                    DNR DNI, confirmed.     Serious Illness Conversation        Life Limiting Disease:  dementia  Disease Specific Information Provided:  disease progression, role of nutrition.   What is your understanding now of where you are with your illness:  spouse verbalized  understanding  What are your fears, worries, or concerns about the future:  pt's increased care needs.   What are you hoping for:  comfort and support   What brings you juanita:  nature  How much does your family know about your priorities and wishes:  limited discussion prior to dementia progressing    Other distressing Symptoms        Pt has been non verbal for years.      Plan and Social Considerations  Discussed pt's current condition and benefits of hospice care.  After discussion, spouse in agreement to meeting with hospice.  Reviewed choices.  Spouse would like [facility].  Referral sent via [facility].     Plan of Care discussed with: team    Thank you for asking Palliative Care to assist with care of this patient.  We will continue to follow  Please contact us for additional questions or concerns.    HUGO Mckeon  Palliative Care   Contact Via Epic Secure chat

## 2025-06-18 NOTE — CARE PLAN
The patient's goals for the shift include  MILADIS    The clinical goals for the shift include Pt will have HR controlled this shift.      Problem: Pain - Adult  Goal: Verbalizes/displays adequate comfort level or baseline comfort level  Outcome: Progressing     Problem: Safety - Adult  Goal: Free from fall injury  Outcome: Progressing     Problem: Discharge Planning  Goal: Discharge to home or other facility with appropriate resources  Outcome: Progressing     Problem: Chronic Conditions and Co-morbidities  Goal: Patient's chronic conditions and co-morbidity symptoms are monitored and maintained or improved  Outcome: Progressing     Problem: Nutrition  Goal: Nutrient intake appropriate for maintaining nutritional needs  Outcome: Progressing     Problem: Skin  Goal: Decreased wound size/increased tissue granulation at next dressing change  Outcome: Progressing  Flowsheets (Taken 6/18/2025 0642)  Decreased wound size/increased tissue granulation at next dressing change: Promote sleep for wound healing  Goal: Participates in plan/prevention/treatment measures  Outcome: Progressing  Flowsheets (Taken 6/18/2025 0642)  Participates in plan/prevention/treatment measures:   Elevate heels   Discuss with provider PT/OT consult  Goal: Prevent/manage excess moisture  Outcome: Progressing  Flowsheets (Taken 6/18/2025 0642)  Prevent/manage excess moisture: Cleanse incontinence/protect with barrier cream  Goal: Prevent/minimize sheer/friction injuries  Outcome: Progressing  Flowsheets (Taken 6/18/2025 0642)  Prevent/minimize sheer/friction injuries:   Turn/reposition every 2 hours/use positioning/transfer devices   HOB 30 degrees or less  Goal: Promote/optimize nutrition  Outcome: Progressing  Flowsheets (Taken 6/18/2025 0642)  Promote/optimize nutrition:   Assist with feeding   Monitor/record intake including meals   Consume > 50% meals/supplements   Offer water/supplements/favorite foods  Goal: Promote skin healing  Outcome:  Progressing  Flowsheets (Taken 6/18/2025 9540)  Promote skin healing: Turn/reposition every 2 hours/use positioning/transfer devices

## 2025-06-19 LAB
ALBUMIN SERPL BCP-MCNC: 3.4 G/DL (ref 3.4–5)
ANION GAP SERPL CALC-SCNC: 14 MMOL/L (ref 10–20)
BUN SERPL-MCNC: 11 MG/DL (ref 6–23)
CALCIUM SERPL-MCNC: 8.5 MG/DL (ref 8.6–10.3)
CHLORIDE SERPL-SCNC: 106 MMOL/L (ref 98–107)
CO2 SERPL-SCNC: 24 MMOL/L (ref 21–32)
CREAT SERPL-MCNC: 0.79 MG/DL (ref 0.5–1.3)
DIGOXIN SERPL-MCNC: 1.5 NG/ML (ref 0.8–?)
EGFRCR SERPLBLD CKD-EPI 2021: 90 ML/MIN/1.73M*2
ERYTHROCYTE [DISTWIDTH] IN BLOOD BY AUTOMATED COUNT: 13.2 % (ref 11.5–14.5)
GLUCOSE SERPL-MCNC: 98 MG/DL (ref 74–99)
HCT VFR BLD AUTO: 37.9 % (ref 41–52)
HGB BLD-MCNC: 12.6 G/DL (ref 13.5–17.5)
LEGIONELLA AG UR QL: NEGATIVE
MAGNESIUM SERPL-MCNC: 2.1 MG/DL (ref 1.6–2.4)
MCH RBC QN AUTO: 32.1 PG (ref 26–34)
MCHC RBC AUTO-ENTMCNC: 33.2 G/DL (ref 32–36)
MCV RBC AUTO: 97 FL (ref 80–100)
NRBC BLD-RTO: 0 /100 WBCS (ref 0–0)
PHOSPHATE SERPL-MCNC: 3.9 MG/DL (ref 2.5–4.9)
PLATELET # BLD AUTO: 240 X10*3/UL (ref 150–450)
POTASSIUM SERPL-SCNC: 3.8 MMOL/L (ref 3.5–5.3)
Q ONSET: 225 MS
QRS COUNT: 23 BEATS
QRS DURATION: 80 MS
QT INTERVAL: 316 MS
QTC CALCULATION(BAZETT): 482 MS
QTC FREDERICIA: 419 MS
R AXIS: 10 DEGREES
RBC # BLD AUTO: 3.92 X10*6/UL (ref 4.5–5.9)
S PNEUM AG UR QL: NEGATIVE
SODIUM SERPL-SCNC: 140 MMOL/L (ref 136–145)
T AXIS: 13 DEGREES
T OFFSET: 383 MS
VENTRICULAR RATE: 140 BPM
WBC # BLD AUTO: 6 X10*3/UL (ref 4.4–11.3)

## 2025-06-19 PROCEDURE — 99232 SBSQ HOSP IP/OBS MODERATE 35: CPT

## 2025-06-19 PROCEDURE — 2500000004 HC RX 250 GENERAL PHARMACY W/ HCPCS (ALT 636 FOR OP/ED)

## 2025-06-19 PROCEDURE — 2060000001 HC INTERMEDIATE ICU ROOM DAILY

## 2025-06-19 PROCEDURE — 2500000002 HC RX 250 W HCPCS SELF ADMINISTERED DRUGS (ALT 637 FOR MEDICARE OP, ALT 636 FOR OP/ED)

## 2025-06-19 PROCEDURE — 2500000001 HC RX 250 WO HCPCS SELF ADMINISTERED DRUGS (ALT 637 FOR MEDICARE OP)

## 2025-06-19 PROCEDURE — 2500000005 HC RX 250 GENERAL PHARMACY W/O HCPCS

## 2025-06-19 PROCEDURE — 83735 ASSAY OF MAGNESIUM: CPT

## 2025-06-19 PROCEDURE — 80069 RENAL FUNCTION PANEL: CPT

## 2025-06-19 PROCEDURE — 80162 ASSAY OF DIGOXIN TOTAL: CPT

## 2025-06-19 PROCEDURE — 85027 COMPLETE CBC AUTOMATED: CPT

## 2025-06-19 PROCEDURE — 36415 COLL VENOUS BLD VENIPUNCTURE: CPT

## 2025-06-19 RX ORDER — METOPROLOL TARTRATE 25 MG/1
25 TABLET, FILM COATED ORAL 2 TIMES DAILY
Status: DISCONTINUED | OUTPATIENT
Start: 2025-06-19 | End: 2025-06-20 | Stop reason: HOSPADM

## 2025-06-19 RX ORDER — DIGOXIN 0.25 MG/ML
125 INJECTION INTRAMUSCULAR; INTRAVENOUS ONCE
Status: DISCONTINUED | OUTPATIENT
Start: 2025-06-19 | End: 2025-06-19

## 2025-06-19 RX ORDER — DIGOXIN 0.25 MG/ML
250 INJECTION INTRAMUSCULAR; INTRAVENOUS ONCE
Status: COMPLETED | OUTPATIENT
Start: 2025-06-19 | End: 2025-06-19

## 2025-06-19 RX ORDER — METOPROLOL TARTRATE 1 MG/ML
5 INJECTION, SOLUTION INTRAVENOUS ONCE
Status: DISCONTINUED | OUTPATIENT
Start: 2025-06-19 | End: 2025-06-19

## 2025-06-19 RX ORDER — METOPROLOL TARTRATE 25 MG/1
25 TABLET, FILM COATED ORAL DAILY
Status: DISCONTINUED | OUTPATIENT
Start: 2025-06-19 | End: 2025-06-19

## 2025-06-19 RX ADMIN — SERTRALINE HYDROCHLORIDE 25 MG: 50 TABLET ORAL at 08:56

## 2025-06-19 RX ADMIN — DIGOXIN 250 MCG: 0.25 INJECTION INTRAMUSCULAR; INTRAVENOUS at 04:16

## 2025-06-19 RX ADMIN — LEVOTHYROXINE SODIUM 25 MCG: 25 TABLET ORAL at 08:56

## 2025-06-19 RX ADMIN — PIPERACILLIN SODIUM AND TAZOBACTAM SODIUM 4.5 G: 4; .5 INJECTION, SOLUTION INTRAVENOUS at 00:25

## 2025-06-19 RX ADMIN — PIPERACILLIN SODIUM AND TAZOBACTAM SODIUM 4.5 G: 4; .5 INJECTION, SOLUTION INTRAVENOUS at 12:01

## 2025-06-19 RX ADMIN — METOPROLOL TARTRATE 25 MG: 25 TABLET, FILM COATED ORAL at 12:01

## 2025-06-19 RX ADMIN — PIPERACILLIN SODIUM AND TAZOBACTAM SODIUM 4.5 G: 4; .5 INJECTION, SOLUTION INTRAVENOUS at 17:36

## 2025-06-19 RX ADMIN — METOPROLOL TARTRATE 25 MG: 25 TABLET, FILM COATED ORAL at 22:57

## 2025-06-19 RX ADMIN — PIPERACILLIN SODIUM AND TAZOBACTAM SODIUM 4.5 G: 4; .5 INJECTION, SOLUTION INTRAVENOUS at 05:51

## 2025-06-19 RX ADMIN — Medication 3 MG: at 20:36

## 2025-06-19 RX ADMIN — OXCARBAZEPINE 300 MG: 150 TABLET, FILM COATED ORAL at 08:57

## 2025-06-19 RX ADMIN — SODIUM CHLORIDE, SODIUM LACTATE, POTASSIUM CHLORIDE, AND CALCIUM CHLORIDE 1000 ML: .6; .31; .03; .02 INJECTION, SOLUTION INTRAVENOUS at 08:53

## 2025-06-19 RX ADMIN — OXCARBAZEPINE 300 MG: 150 TABLET, FILM COATED ORAL at 20:36

## 2025-06-19 RX ADMIN — AZITHROMYCIN MONOHYDRATE 500 MG: 500 INJECTION, POWDER, LYOPHILIZED, FOR SOLUTION INTRAVENOUS at 09:02

## 2025-06-19 ASSESSMENT — PAIN SCALES - PAIN ASSESSMENT IN ADVANCED DEMENTIA (PAINAD)
BODYLANGUAGE: RELAXED
BREATHING: NORMAL
TOTALSCORE: 0
CONSOLABILITY: NO NEED TO CONSOLE
TOTALSCORE: REPOSITIONED
FACIALEXPRESSION: SMILING OR INEXPRESSIVE

## 2025-06-19 ASSESSMENT — COGNITIVE AND FUNCTIONAL STATUS - GENERAL
MOVING FROM LYING ON BACK TO SITTING ON SIDE OF FLAT BED WITH BEDRAILS: TOTAL
CLIMB 3 TO 5 STEPS WITH RAILING: TOTAL
MOBILITY SCORE: 6
DRESSING REGULAR LOWER BODY CLOTHING: TOTAL
MOVING TO AND FROM BED TO CHAIR: TOTAL
DRESSING REGULAR UPPER BODY CLOTHING: TOTAL
WALKING IN HOSPITAL ROOM: TOTAL
TURNING FROM BACK TO SIDE WHILE IN FLAT BAD: TOTAL
TOILETING: TOTAL
PERSONAL GROOMING: TOTAL
DAILY ACTIVITIY SCORE: 6
STANDING UP FROM CHAIR USING ARMS: TOTAL
EATING MEALS: TOTAL
HELP NEEDED FOR BATHING: TOTAL

## 2025-06-19 ASSESSMENT — PAIN - FUNCTIONAL ASSESSMENT: PAIN_FUNCTIONAL_ASSESSMENT: PAINAD (PAIN ASSESSMENT IN ADVANCED DEMENTIA SCALE)

## 2025-06-19 ASSESSMENT — PAIN SCALES - GENERAL: PAINLEVEL_OUTOF10: 0 - NO PAIN

## 2025-06-19 NOTE — PROGRESS NOTES
06/19/25 1207   Discharge Planning   Expected Discharge Disposition Inter  (From [facility]. Plan for meeting with [facility] tomorrow between 1:30-2)   Does the patient need discharge transport arranged? Yes   RoundTrip coordination needed? Yes   Has discharge transport been arranged? No   Patient Choice   Provider Choice list and CMS website (https://medicare.gov/care-compare#search) for post-acute Quality and Resource Measure Data were provided and reviewed with: Family

## 2025-06-19 NOTE — PROGRESS NOTES
Palliative Care Social Work Note     Met with pt, spouse, and great granddtr.  Pt was awake but did not respond to voice.  HWR meeting 6/20/25 @ 1330/1400.  Spouse said several staff at NH said pt does not need hospice, NH can provide comfort care.  Reviewed role of hospice.  Spouse would like to proceed with hospice.

## 2025-06-19 NOTE — PROGRESS NOTES
Department of Hospital Medicine  Progress Note    Subjective     Rylan Garcia is a 80 y.o. male on Hospital Day: 3 of admission presenting with Pneumonia due to infectious organism, unspecified laterality, unspecified part of lung.    Overnight Events: Tachycardic overnight. He was also hypotensive so loaded with digoxin.     He is nonverbal at baseline this morning but appears comfortable and eating breakfast. Updated daughter at bedside. Will plan for hospice discussion tomorrow with family. He was slightly hypotensive this morning - gave 1L bolus.     Objective   Vitals:  Vitals:    06/19/25 0448 06/19/25 0755 06/19/25 0800 06/19/25 1225   BP: 88/67 112/78 112/78 96/63   BP Location: Left arm Left arm  Left arm   Patient Position: Lying Lying  Lying   Pulse: 110 107  106   Resp: 16 15  15   Temp: 36.8 °C (98.2 °F) 36.6 °C (97.9 °F) 36.6 °C (97.9 °F) 36.3 °C (97.3 °F)   TempSrc: Temporal Temporal  Temporal   SpO2: 97% 95%  97%   Weight:       Height:           Intake/Output last 3 Shifts:  I/O last 3 completed shifts:  In: 1766.7 (24.7 mL/kg) [P.O.:90; I.V.:276.7 (3.9 mL/kg); IV Piggyback:1400]  Out: 1750 (24.4 mL/kg) [Urine:1750 (0.7 mL/kg/hr)]  Weight: 71.6 kg   I/O this shift:  In: 1440 [P.O.:90; IV Piggyback:1350]  Out: -     Last stool output  Last BM Date: 06/18/25    Physical Exam:   Physical Exam  Vitals reviewed.   Cardiovascular:      Rate and Rhythm: Normal rate. Rhythm irregular.      Heart sounds: Normal heart sounds. No murmur heard.     No gallop.   Pulmonary:      Effort: No respiratory distress.      Breath sounds: Normal breath sounds.   Abdominal:      General: Abdomen is flat. There is no distension.      Palpations: Abdomen is soft.      Tenderness: There is no abdominal tenderness.   Musculoskeletal:      Right lower leg: No edema.      Left lower leg: No edema.      Comments: Arms contracted   Skin:     General: Skin is warm and dry.   Neurological:      Mental Status: Mental status is  "at baseline.   Psychiatric:         Mood and Affect: Mood normal.         Behavior: Behavior normal.          Vent settings (if applicable):        LABS:    CBC:  Results from last 72 hours   Lab Units 06/19/25  0546 06/18/25 0600 06/17/25  2230   WBC AUTO x10*3/uL 6.0 7.4 8.9   RBC AUTO x10*6/uL 3.92* 3.77* 4.01*   HEMOGLOBIN g/dL 12.6* 12.1* 13.1*   HEMATOCRIT % 37.9* 36.0* 39.2*   MCV fL 97 96 98   MCH pg 32.1 32.1 32.7   MCHC g/dL 33.2 33.6 33.4   RDW % 13.2 13.2 13.2   PLATELETS AUTO x10*3/uL 240 235 260     CMP:  Results from last 72 hours   Lab Units 06/19/25  0546 06/18/25 0600 06/17/25  2230   SODIUM mmol/L 140 137 135*   POTASSIUM mmol/L 3.8 3.6 4.1   CHLORIDE mmol/L 106 107 105   CO2 mmol/L 24 23 21   ANION GAP mmol/L 14 11 13   BUN mg/dL 11 12 16   CREATININE mg/dL 0.79 0.70 0.75   EGFR mL/min/1.73m*2 90 >90 >90   GLUCOSE mg/dL 98 103* 120*     Results from last 72 hours   Lab Units 06/19/25  0546 06/18/25  0600 06/17/25  2230   ALBUMIN g/dL 3.4 3.3* 3.7   ALK PHOS U/L  --   --  101   ALT U/L  --   --  33   AST U/L  --   --  18   BILIRUBIN TOTAL mg/dL  --   --  0.4     Calcium/Phos:   Results from last 72 hours   Lab Units 06/19/25  0546 06/18/25  0600 06/17/25  2230   CALCIUM mg/dL 8.5* 8.2* 8.6   PHOSPHORUS mg/dL 3.9 3.6  --       COAG:     CRP: No results found for: \"CRP\"    ENDO:  Recent Labs     04/28/22  1515 10/08/21  1042 10/01/20  1220 07/20/20  1116 01/10/19  1326 05/22/18  1011   TSH 2.53 2.29 2.85 2.10   < >  --    HGBA1C  --   --   --   --   --  5.7    < > = values in this interval not displayed.      CARDIAC:   Results from last 72 hours   Lab Units 06/17/25  2318 06/17/25  2230   TROPHS ng/L 8 8   BNP pg/mL  --  258*       No data recorded    MICRO/ID:   No results found for the last 90 days.                   No lab exists for component: \"AGALPCRNB\"   Lab Results   Component Value Date    URINECULTURE NO GROWTH 03/17/2023    BLOODCULT No growth at 1 day 06/17/2025    BLOODCULT No growth " at 1 day 06/17/2025       IMAGING RESULTS:   CT chest abdomen pelvis wo IV contrast  Result Date: 6/18/2025  1. Small area of consolidation and adjacent pleural effusion right lower lobe likely representing pneumonia.   2. Numerous lytic lesions within the axial skeleton, many which are larger compared to 06/13/2023 consistent with multiple myeloma. These are seen in the posterior right 5th rib, T5 and T11 vertebral bodies, L3 spinous process, and left annmarie sacrum at S1.   3. Moderate cardiomegaly with small pericardial effusion.   4. No acute process in the abdomen or pelvis.   5. 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 Do not see any workup on the multiple myeloma in the chart which is progressed from prior study in 2023.   MACRO: Kurt Salinas discussed the significance and urgency of this critical finding by EPIC secure chat with  AHSAN SHAH on 6/18/2025 at 1:03 am.  (**-RCF-**) Findings:  See findings.   Signed by: Kurt Salinas 6/18/2025 1:03 AM Dictation workstation:   [id number]    CT head wo IV contrast  Result Date: 6/18/2025  No acute intracranial abnormality.   Redemonstrated chronic subdural fluid collection deep to the right craniotomy and overlying the right frontal lobe which is decreased in sized/thickness compared to prior study, previously measuring up to 1.1 cm, now approximately 0.6 cm.   Severe supratentorial volume loss similar to prior study.   MACRO: None.   Signed by: Kurt Salinas 6/18/2025 12:28 AM Dictation workstation:   [id number]    XR chest 1 view  Result Date: 6/17/2025  1.  Pulmonary vascular congestion suggests volume overload, with suspected acute pulmonary interstitial edema/CHF. No focal consolidation, sizeable pleural effusion, or pneumothorax. 2. New abnormal right paratracheal opacity, suspicious for mass or lymphadenopathy. Focal pneumonia is also possible but thought somewhat less likely. Further evaluation with chest CT  recommended.       MACRO: None   Signed by: Chaz Cox 6/17/2025 11:46 PM Dictation workstation:   [id number]      ALLERGIES PAST MEDICAL HISTORY PAST SURGICAL SURGERY FAMILY HISTORY SOCIAL HISTORY   Allergies[1] Medical History[2] Surgical History[3] Family History[4] Social History[5]         MEDS:  HOME MEDS SCHEDULED MEDS PRN IV MEDS   Current Outpatient Medications   Medication Instructions    alfuzosin (Uroxatral) 10 mg 24 hr tablet 1 tablet, Daily    coenzyme Q-10 100 mg, Daily    docusate sodium (Colace) 100 mg capsule 1 tablet, Daily    doxazosin (CARDURA) 2 mg, oral, Nightly    finasteride (PROSCAR) 5 mg, Daily    glucosamine sulfate 1,000 mg capsule oral    hydrOXYzine pamoate (VISTARIL) 25 mg, 2 times daily    levothyroxine (Synthroid, Levoxyl) 25 mcg tablet 1 tablet, Daily    melatonin 5 mg, Nightly PRN    multivitamin tablet 1 tablet, Daily    omega 3-dha-epa-fish oil (Fish OiL) 360-1,200 mg capsule Take by mouth.    OXcarbazepine (Trileptal) 300 mg tablet 1 tablet, 2 times daily    sertraline (ZOLOFT) 25 mg, oral, Daily    Scheduled Medications[6] PRN Medications[7] Continuous Medications[8]          Assessment/Plan   ASSESSMENT & PLAN  Rylan Garcia is a 80 y.o. male with PMHx significant for frontotemporal dementia (non-verbal at baseline) with dysphagia, subdural hematoma, HTN, BPH, and hypothyroidism admitted on 6/17/2025 with sepsis 2/2 PNA with concern for aspiration. Also, worsening lytic lesions noted on CT imaging concerning for possible multiple myeloma diagnosis.     ACUTE MEDICAL ISSUES  #Sepsis 2/2 pneumonia - concern for HAP vs aspiration pneumonia   #Acute metabolic encephalopathy 2/2 sepsis  ::CT chest with small area of consolidation and adjacent pleural effusion in R lower lobe consistent with PNA  ::S/p vancomycin and zosyn in the ED  ::S/p 2L NS in the ED   -Legionella / strep pneumonia - negative   -Blood cultures ordered - neg x 1 day  PLAN:  -Cont Zosyn due to risk factors of  living in nursing home  -cont azithromycin  -Respiratory cultures ordered and pending  -DuoNebs PRN for wheezing  -Patient placed on 2L NC in ED but without desaturation - wean O2 as able      #Tachycardia - resolving   #PACs  ::HR persistently in 140s - appears to be sinus tachycardia on EKG  ::S/p diltiazem and digoxin push in the ED with no improvement - suspect tachycardia 2/2 sepsis   ::Patient evaluated by EP in the past who stated NO paroxysmal Afib but rather PACs  - s/p 1x dose of 40mg IV lasix   - s/p 3x doses of Lopressor 5mg   PLAN:  - Monitor patient on telemetry  - pain meds ordered - no improvement to HR   - stop digoxin   - will transition to metoprolol 25mg daily for rate control - titrate up to BID as needed.      #Axial skeleton lytic lesions - concern for multiple myeloma  ::Wife aware - open to discussing hospice given progressive dementia and possible new cancer diagnosis  - Differentials include malignant bone tumors, possible prostate cancer, multiple myeloma (though labs not very consistent currently)  PLAN:  -SPEP/UPEP ordered  -Palliative care consulted - will plan for hospice discussion tomorrow     #Hx dysphagia with concern for aspiration  ::Currently on modified diet in nursing facility; soft solids and nectar thick liquids  ::Suspect dysphagia likely progressing with progressive nature of dementia   PLAN:  -SLP consulted - OK for pureed diet, mildly / nectar-thick liquid  -Palliative consulted as above - plan for hospice discussion tomorrow.     CHRONIC MEDICAL ISSUES:  #BPH - continue home finasteride 5 mg , HOLD alfuzosin 10 mg in setting of soft BP  #MDD - continue home sertraline 50 mg  #Hypothyroidism - continue home levothyroxine 25 mcg daily   #Eczema #Hyperkeratosis - continue home ointments     NUTRITION STATUS:  Will hold consult at this time due to palliative consult.       Fluids: PRN  Electrolytes: Replete PRN  Nutrition: pureed diet, mildly / nectar-thick liquid  GI  Prophylaxis: None  DVT Prophylaxis: SCD, Reason: prolonged stay in bed  Last Bowel Movement: Last BM Date: 25    Access: 2x PIV  Antibiotics: Azithromycin / Zosyn  Oxygenation: Room Air    Emergency Contact: Extended Emergency Contact Information  Primary Emergency Contact: Flor Garcia  Address: [address] [place] [address] [place] [address] [facility]  Home Phone: [phone]  Mobile Phone: [phone]  Relation: Spouse  Secondary Emergency Contact: Christina Garcia  Home Phone: [phone]  Relation: None    Dispo: Pending hospice meeting tomorrow.     Yunior Willson DO  Internal Medicine, PGY-II       [1]   Allergies  Allergen Reactions    Aricept [Donepezil] Other    Namenda [Memantine] Unknown   [2]   Past Medical History:  Diagnosis Date    Other specified abnormal findings of blood chemistry     Blood cholesterol increased compared with prior measurement    Other specified postprocedural states     Status post hernia repair    Unspecified cataract     Cataracts, bilateral   [3]   Past Surgical History:  Procedure Laterality Date    CATARACT EXTRACTION  2018    Cataract Extraction    HERNIA REPAIR  2018    Hernia Repair   [4]   Family History  Problem Relation Name Age of Onset    No Known Problems Mother      No Known Problems Father     [5]   Social History  Tobacco Use    Smoking status: Former     Current packs/day: 0.00     Types: Cigarettes     Start date: 1965     Quit date: 1975     Years since quittin.4    Smokeless tobacco: Never   Substance Use Topics    Alcohol use: Not Currently    Drug use: Never   [6] [Held by provider] alfuzosin, 10 mg, oral, Daily  azithromycin, 500 mg, intravenous, q24h  docusate sodium, 100 mg, oral, Daily  [Held by provider] doxazosin, 2 mg, oral, Nightly  finasteride, 5 mg, oral, Daily  levothyroxine, 25 mcg, oral, Daily  melatonin, 3 mg, oral, Nightly  metoprolol tartrate, 25 mg, oral, Daily  OXcarbazepine, 300 mg, oral,  BID  piperacillin-tazobactam, 4.5 g, intravenous, q6h  sertraline, 25 mg, oral, Daily     [7] PRN medications: acetaminophen **OR** acetaminophen, ipratropium-albuteroL, morphine, morphine  [8]

## 2025-06-19 NOTE — HOSPITAL COURSE
Rylan Garcia is a 80 y.o. year old male  patient with with PMHx significant for frontotemporal dementia (non-verbal at baseline) with dysphagia, subdural hematoma, HTN, BPH, and hypothyroidism who presented to [facility] on 6/17 from nursing facility for tachycardia and altered mental status. Over the past few days they have noted some behavioral changes stating that he often is closing his eyes, not smiling, eating less of his food than normal.  They then noticed that he has been becoming diaphoretic and noted to have tachycardia in the 120s throughout yesterday.  He came into the ER with tachycardia worsened with heart rate in the 140s and with borderline hypotension, so patient was brought to the ER.     ER workup was significant for tachycardia. On CT imaging, there was a small area of consolidation and adjacent pleural effusion right lower lobe likely representing pneumonia. There was also numerous lytic lesions within the axial skeleton, many which are larger compared to 06/13/2023 consistent with multiple myeloma. These are seen in the posterior right 5th rib, T5 and T11 vertebral bodies, L3 spinous process, and left annmarie sacrum at S1.     He was treated with antibiotics for his pneumonia and started on metoprolol to rate control his tachycardia. After further discussion with family, the patient was transitioned to hospice care.

## 2025-06-19 NOTE — CARE PLAN
The patient's goals for the shift include      The clinical goals for the shift include Pt will remain safe while in hospital environment.    Over the shift, the patient did make progress towards aforementioned goal, with frequent visits by family pt was with company for the majority of the day.

## 2025-06-20 VITALS
DIASTOLIC BLOOD PRESSURE: 65 MMHG | BODY MASS INDEX: 25.36 KG/M2 | SYSTOLIC BLOOD PRESSURE: 87 MMHG | HEART RATE: 140 BPM | RESPIRATION RATE: 18 BRPM | WEIGHT: 157.8 LBS | TEMPERATURE: 97.3 F | OXYGEN SATURATION: 92 % | HEIGHT: 66 IN

## 2025-06-20 LAB
ALBUMIN SERPL BCP-MCNC: 3.3 G/DL (ref 3.4–5)
ANION GAP SERPL CALC-SCNC: 10 MMOL/L (ref 10–20)
BUN SERPL-MCNC: 10 MG/DL (ref 6–23)
CALCIUM SERPL-MCNC: 8.4 MG/DL (ref 8.6–10.3)
CHLORIDE SERPL-SCNC: 106 MMOL/L (ref 98–107)
CO2 SERPL-SCNC: 26 MMOL/L (ref 21–32)
CREAT SERPL-MCNC: 0.85 MG/DL (ref 0.5–1.3)
EGFRCR SERPLBLD CKD-EPI 2021: 88 ML/MIN/1.73M*2
ERYTHROCYTE [DISTWIDTH] IN BLOOD BY AUTOMATED COUNT: 12.8 % (ref 11.5–14.5)
GLUCOSE SERPL-MCNC: 100 MG/DL (ref 74–99)
HCT VFR BLD AUTO: 38.4 % (ref 41–52)
HGB BLD-MCNC: 12.9 G/DL (ref 13.5–17.5)
MAGNESIUM SERPL-MCNC: 2.01 MG/DL (ref 1.6–2.4)
MCH RBC QN AUTO: 32.7 PG (ref 26–34)
MCHC RBC AUTO-ENTMCNC: 33.6 G/DL (ref 32–36)
MCV RBC AUTO: 98 FL (ref 80–100)
NRBC BLD-RTO: 0 /100 WBCS (ref 0–0)
PHOSPHATE SERPL-MCNC: 3.7 MG/DL (ref 2.5–4.9)
PLATELET # BLD AUTO: 246 X10*3/UL (ref 150–450)
POTASSIUM SERPL-SCNC: 3.6 MMOL/L (ref 3.5–5.3)
RBC # BLD AUTO: 3.94 X10*6/UL (ref 4.5–5.9)
SODIUM SERPL-SCNC: 138 MMOL/L (ref 136–145)
WBC # BLD AUTO: 7.3 X10*3/UL (ref 4.4–11.3)

## 2025-06-20 PROCEDURE — 2500000004 HC RX 250 GENERAL PHARMACY W/ HCPCS (ALT 636 FOR OP/ED)

## 2025-06-20 PROCEDURE — 2500000001 HC RX 250 WO HCPCS SELF ADMINISTERED DRUGS (ALT 637 FOR MEDICARE OP)

## 2025-06-20 PROCEDURE — 9420000001 HC RT PATIENT EDUCATION 5 MIN

## 2025-06-20 PROCEDURE — 85027 COMPLETE CBC AUTOMATED: CPT

## 2025-06-20 PROCEDURE — 2500000002 HC RX 250 W HCPCS SELF ADMINISTERED DRUGS (ALT 637 FOR MEDICARE OP, ALT 636 FOR OP/ED)

## 2025-06-20 PROCEDURE — 36415 COLL VENOUS BLD VENIPUNCTURE: CPT

## 2025-06-20 PROCEDURE — 94640 AIRWAY INHALATION TREATMENT: CPT

## 2025-06-20 PROCEDURE — 94760 N-INVAS EAR/PLS OXIMETRY 1: CPT

## 2025-06-20 PROCEDURE — 80069 RENAL FUNCTION PANEL: CPT

## 2025-06-20 PROCEDURE — 99238 HOSP IP/OBS DSCHRG MGMT 30/<: CPT

## 2025-06-20 PROCEDURE — 94664 DEMO&/EVAL PT USE INHALER: CPT

## 2025-06-20 PROCEDURE — 2500000005 HC RX 250 GENERAL PHARMACY W/O HCPCS: Performed by: STUDENT IN AN ORGANIZED HEALTH CARE EDUCATION/TRAINING PROGRAM

## 2025-06-20 PROCEDURE — 83735 ASSAY OF MAGNESIUM: CPT

## 2025-06-20 PROCEDURE — 2500000002 HC RX 250 W HCPCS SELF ADMINISTERED DRUGS (ALT 637 FOR MEDICARE OP, ALT 636 FOR OP/ED): Performed by: STUDENT IN AN ORGANIZED HEALTH CARE EDUCATION/TRAINING PROGRAM

## 2025-06-20 RX ORDER — ACETAMINOPHEN 325 MG/1
650 TABLET ORAL EVERY 6 HOURS
Status: DISCONTINUED | OUTPATIENT
Start: 2025-06-20 | End: 2025-06-20 | Stop reason: HOSPADM

## 2025-06-20 RX ORDER — ACETAMINOPHEN 160 MG/5ML
650 SOLUTION ORAL EVERY 6 HOURS
Status: DISCONTINUED | OUTPATIENT
Start: 2025-06-20 | End: 2025-06-20 | Stop reason: HOSPADM

## 2025-06-20 RX ADMIN — ACETAMINOPHEN 650 MG: 650 SOLUTION ORAL at 12:01

## 2025-06-20 RX ADMIN — PIPERACILLIN SODIUM AND TAZOBACTAM SODIUM 4.5 G: 4; .5 INJECTION, SOLUTION INTRAVENOUS at 00:19

## 2025-06-20 RX ADMIN — Medication 2 L/MIN: at 08:21

## 2025-06-20 RX ADMIN — IPRATROPIUM BROMIDE AND ALBUTEROL SULFATE 3 ML: 2.5; .5 SOLUTION RESPIRATORY (INHALATION) at 00:40

## 2025-06-20 RX ADMIN — PIPERACILLIN SODIUM AND TAZOBACTAM SODIUM 4.5 G: 4; .5 INJECTION, SOLUTION INTRAVENOUS at 05:47

## 2025-06-20 RX ADMIN — METOPROLOL TARTRATE 25 MG: 25 TABLET, FILM COATED ORAL at 09:11

## 2025-06-20 RX ADMIN — SERTRALINE HYDROCHLORIDE 25 MG: 50 TABLET ORAL at 09:11

## 2025-06-20 RX ADMIN — DOCUSATE SODIUM 100 MG: 100 CAPSULE, LIQUID FILLED ORAL at 09:13

## 2025-06-20 RX ADMIN — AZITHROMYCIN MONOHYDRATE 500 MG: 500 INJECTION, POWDER, LYOPHILIZED, FOR SOLUTION INTRAVENOUS at 09:13

## 2025-06-20 RX ADMIN — LEVOTHYROXINE SODIUM 25 MCG: 25 TABLET ORAL at 09:12

## 2025-06-20 RX ADMIN — FINASTERIDE 5 MG: 5 TABLET, FILM COATED ORAL at 09:12

## 2025-06-20 RX ADMIN — PIPERACILLIN SODIUM AND TAZOBACTAM SODIUM 4.5 G: 4; .5 INJECTION, SOLUTION INTRAVENOUS at 12:01

## 2025-06-20 RX ADMIN — OXCARBAZEPINE 300 MG: 150 TABLET, FILM COATED ORAL at 09:12

## 2025-06-20 ASSESSMENT — PAIN SCALES - WONG BAKER: WONGBAKER_NUMERICALRESPONSE: NO HURT

## 2025-06-20 ASSESSMENT — PAIN SCALES - PAIN ASSESSMENT IN ADVANCED DEMENTIA (PAINAD)
TOTALSCORE: REPOSITIONED
BREATHING: OCCASIONAL LABORED BREATHING, SHORT PERIOD OF HYPERVENTILATION
TOTALSCORE: 2
CONSOLABILITY: NO NEED TO CONSOLE
FACIALEXPRESSION: SMILING OR INEXPRESSIVE
BODYLANGUAGE: TENSE, DISTRESSED PACING, FIDGETING

## 2025-06-20 ASSESSMENT — PAIN SCALES - GENERAL: PAINLEVEL_OUTOF10: 0 - NO PAIN

## 2025-06-20 NOTE — PROGRESS NOTES
06/20/25 1207   Discharge Planning   Living Arrangements Other (Comment)  [facility]   Support Systems Spouse/significant other;Family members   Assistance Needed [facility], history of dementia, per daughter minimally verbal, 2 person assist for transfers, no 02 baseline, pureed diet with nectar thickened liquids,   Type of Residence Nursing home/residential care   Do you have animals or pets at home? No   Who is requesting discharge planning? Provider   Home or Post Acute Services Post acute facilities (Rehab/SNF/etc)   Type of Post Acute Facility Services Long term care   Expected Discharge Disposition Inter  (TBD: Hospice consulted, meeting today with [facility] @ 1:30.)   Does the patient need discharge transport arranged? Yes   Stroke Family Assessment   Stroke Family Assessment Needed No   Intensity of Service   Intensity of Service 0-30 min     6/20/25 @ 1500: Family met and signing with [facility] and returning to [facility]. Orders sent to facility in [facility]. Transport confirmed for 1700 pickup. Patient, family ( at bedside), RN, and facility notified of discharge time. Number for N2N report provided to Raquel PHIPPS.

## 2025-06-20 NOTE — CARE PLAN
The patient's goals for the shift include  to get some rest.    The clinical goals for the shift include Pt. will remain safe during this shift.         24

## 2025-06-20 NOTE — CARE PLAN
The patient's goals for the shift include  MILADIS.    The clinical goals for the shift include Pt. will remain safe during this shift.        Problem: Pain - Adult  Goal: Verbalizes/displays adequate comfort level or baseline comfort level  Outcome: Progressing     Problem: Safety - Adult  Goal: Free from fall injury  Outcome: Progressing     Problem: Skin  Goal: Participates in plan/prevention/treatment measures  Outcome: Progressing  Flowsheets (Taken 6/20/2025 0052)  Participates in plan/prevention/treatment measures: Elevate heels

## 2025-06-20 NOTE — DISCHARGE SUMMARY
Discharge Diagnosis  Hospice  Sepsis 2/2 pneumonia - concern for HAP vs aspiration pneumonia   Acute metabolic encephalopathy 2/2 sepsis   Tachycardia   Axial skeleton lytic lesions concerning for metastatic cancer / multiple myeloma  Hx dysphagia with concern for aspiration     Issues Requiring Follow-Up  Follow up with hospice    Discharge Meds     Medication List      CONTINUE taking these medications     alfuzosin 10 mg 24 hr tablet; Commonly known as: Uroxatral   coenzyme Q-10 100 mg capsule   docusate sodium 100 mg capsule; Commonly known as: Colace   doxazosin 2 mg tablet; Commonly known as: Cardura   finasteride 5 mg tablet; Commonly known as: Proscar   Fish OiL 360-1,200 mg capsule; Generic drug: omega 3-dha-epa-fish oil   glucosamine sulfate 1,000 mg capsule   hydrOXYzine pamoate 25 mg capsule; Commonly known as: Vistaril   levothyroxine 25 mcg tablet; Commonly known as: Synthroid, Levoxyl   melatonin 5 mg tablet   multivitamin tablet   OXcarbazepine 300 mg tablet; Commonly known as: Trileptal   sertraline 25 mg tablet; Commonly known as: Zoloft       Test Results Pending At Discharge  Pending Labs       Order Current Status    Serum Protein Electrophoresis + Immunofixation In process    Serum Protein Electrophoresis + Immunofixation In process    Urine Immunofixation In process    Urine Protein Electrophoresis In process    Urine Protein Electrophoresis In process    Blood Culture Preliminary result    Blood Culture Preliminary result            Hospital Course  Rylan Garcia is a 80 y.o. year old male  patient with with PMHx significant for frontotemporal dementia (non-verbal at baseline) with dysphagia, subdural hematoma, HTN, BPH, and hypothyroidism who presented to [facility] on 6/17 from nursing facility for tachycardia and altered mental status. Over the past few days they have noted some behavioral changes stating that he often is closing his eyes, not smiling, eating less of his food than normal.   They then noticed that he has been becoming diaphoretic and noted to have tachycardia in the 120s throughout yesterday.  He came into the ER with tachycardia worsened with heart rate in the 140s and with borderline hypotension, so patient was brought to the ER.     ER workup was significant for tachycardia. On CT imaging, there was a small area of consolidation and adjacent pleural effusion right lower lobe likely representing pneumonia. There was also numerous lytic lesions within the axial skeleton, many which are larger compared to 06/13/2023 consistent with multiple myeloma. These are seen in the posterior right 5th rib, T5 and T11 vertebral bodies, L3 spinous process, and left annmarie sacrum at S1.     He was treated with antibiotics for his pneumonia and started on metoprolol to rate control his tachycardia. After further discussion with family, the patient was transitioned to hospice care.     Pertinent Physical Exam At Time of Discharge  Physical Exam  Vitals reviewed.   Cardiovascular:      Rate and Rhythm: Tachycardia present. Rhythm irregular.      Heart sounds: Normal heart sounds. No murmur heard.     No gallop.   Pulmonary:      Effort: No respiratory distress.      Breath sounds: Normal breath sounds.   Abdominal:      General: Abdomen is flat. There is no distension.      Palpations: Abdomen is soft.      Tenderness: There is no abdominal tenderness.   Musculoskeletal:      Right lower leg: No edema.      Left lower leg: No edema.      Comments: Arms contracted   Skin:     General: Skin is warm and dry.   Neurological:      Mental Status: Mental status is at baseline.   Psychiatric:         Mood and Affect: Mood normal.         Behavior: Behavior normal.       Outpatient Follow-Up  No future appointments.      Yunoir Willson DO

## 2025-06-20 NOTE — NURSING NOTE
RN Hospice Note    Rylan Garcia is a Hospice Patient.   Hospice terminal diagnosis: CVD with vascular dementia   Physician: Dr nAdersen  Visit type: Admission/ discharge to LTC    Comments/recommendations: Met with pts wife Flor and daughters Christina and Raquel. Reviewed hospice philosophy and goals of care for patient. Family in agreement with start of hospice care when pt returns to LTC facility ( [facility]). JING and broda chair to be ordered for patient for delivery as soon as possible.     Discharge Planning:  Patient to be discharged to nursing home    The following is to be completed:  Discharge order: yes  State DNR signed by MD: yes  Nursing facility referral/transfer form: yes  Medication reconciliation: yes  PAS/RR or convalescent stay form:   Prescriptions for al narcotics/new medications: NA  Transportation: Pt to be picked up at 5 pm- arranged by hospital staff  Other:     Plan of care reviewed with patient/family members Wife Flor and daughters Christina and Raquel   Plan of care reviewed with hospital staff members: Dr Andersen, Colleen Mata, SW, Maribel Han, TCC     Please notify [facility] of any changes in condition. Thank you.  Office: [phone] (8 am-6:30 pm M-F and 8 am-4:30 pm weekends and holidays)   [phone] (6:30 pm-8 am M-F and 4:30 pm-8 am weekends and holidays)    Saige Tavarez RN, [facility]  [facility]

## 2025-06-20 NOTE — PROGRESS NOTES
Palliative Care Social Work Note     Update from the team.    Met with pt and spouse.  Pt unable to participate in visit.  Spouse states she is having second thought about hospice, concerned about the 'rules', ie pt cannot return to the hospital.  Reviewed philosophy of hospice and role of the ED/hospital.  Reviewed hospice services at NH.  Spouse inquiring if pt is eligible for [facility] to provide caregivers at NH.  Advised spouse [facility], if eligible, can provide services in the home but not NH.  Reviewed the additional support hospice provides.  Spouse relayed that she visits twice a week as 'it is too much'.  Then spouse stated that she is considering moving pt closer to her ([place]).  Discussed that spouse earlier indicated pt is at [place] because she could not find an acceptable facility closer to [place].  Advised spouse that hospice social worker could help in determining if there is a more suitable NH.  Spouse stated that at least one of pt's dtrs will be at the hospice meeting.  Team updated.

## 2025-06-20 NOTE — DISCHARGE INSTRUCTIONS
Thank you for choosing [facility] - it has been a pleasure taking part in your medical care. Hospice will continue to manage your care going forward.      You were admitted to the hospital for aspiration pneumonia and dehydration. There was concern for new metastatic cancer due to new findings of lytic bone lesions. After further discussion, we transitioned to hospice care.     Hospice will continue to manage your care from here.

## 2025-06-22 LAB
BACTERIA BLD CULT: NORMAL
BACTERIA BLD CULT: NORMAL

## 2025-06-23 ENCOUNTER — NURSING HOME VISIT (OUTPATIENT)
Dept: POST ACUTE CARE | Facility: EXTERNAL LOCATION | Age: 80
End: 2025-06-23
Payer: MEDICARE

## 2025-06-23 DIAGNOSIS — G31.85 DEMENTIA IN CORTICOBASAL DEGENERATION (MULTI): ICD-10-CM

## 2025-06-23 DIAGNOSIS — F02.80 DEMENTIA IN CORTICOBASAL DEGENERATION (MULTI): ICD-10-CM

## 2025-06-23 DIAGNOSIS — J18.9 PNEUMONIA DUE TO INFECTIOUS ORGANISM, UNSPECIFIED LATERALITY, UNSPECIFIED PART OF LUNG: Primary | ICD-10-CM

## 2025-06-23 DIAGNOSIS — N40.1 BENIGN PROSTATIC HYPERPLASIA WITH LOWER URINARY TRACT SYMPTOMS, SYMPTOM DETAILS UNSPECIFIED: ICD-10-CM

## 2025-06-23 PROBLEM — C90.00 MULTIPLE MYELOMA NOT HAVING ACHIEVED REMISSION (MULTI): Status: ACTIVE | Noted: 2025-06-23

## 2025-06-23 LAB
ALBUMIN MFR UR ELPH: 9.7 %
ALBUMIN: 3.3 G/DL (ref 3.4–5)
ALPHA 1 GLOBULIN: 0.4 G/DL (ref 0.2–0.6)
ALPHA 2 GLOBULIN: 0.8 G/DL (ref 0.4–1.1)
ALPHA1 GLOB MFR UR ELPH: 7 %
ALPHA2 GLOB MFR UR ELPH: 5.7 %
B-GLOBULIN MFR UR ELPH: 69.8 %
BETA GLOBULIN: 0.7 G/DL (ref 0.5–1.2)
GAMMA GLOB MFR UR ELPH: 7.8 %
GAMMA GLOBULIN: 0.6 G/DL (ref 0.5–1.4)
IMMUNOFIXATION COMMENT: ABNORMAL
IMMUNOFIXATION COMMENT: NORMAL
M-PROTEIN 1 URINE %: 49.3 % (ref ?–0)
PATH REVIEW - SERUM IMMUNOFIXATION: ABNORMAL
PATH REVIEW - URINE IMMUNOFIXATION: NORMAL
PATH REVIEW-SERUM PROTEIN ELECTROPHORESIS: ABNORMAL
PATH REVIEW-URINE PROTEIN ELECTROPHORESIS: ABNORMAL
PROTEIN ELECTROPHORESIS COMMENT: ABNORMAL
URINE ELECTROPHORESIS COMMENT: ABNORMAL

## 2025-06-23 PROCEDURE — 99305 1ST NF CARE MODERATE MDM 35: CPT | Performed by: FAMILY MEDICINE

## 2025-06-23 ASSESSMENT — ENCOUNTER SYMPTOMS
HEADACHES: 0
WOUND: 0
CONFUSION: 1
BRUISES/BLEEDS EASILY: 0
SHORTNESS OF BREATH: 0
WEAKNESS: 1
PALPITATIONS: 0
ABDOMINAL PAIN: 0
SORE THROAT: 0
EYE PAIN: 0
NECK STIFFNESS: 0
EYE REDNESS: 0
COUGH: 0
FEVER: 0
DYSURIA: 0
FATIGUE: 0
BACK PAIN: 0
ADENOPATHY: 0
POLYDIPSIA: 0
HALLUCINATIONS: 0
BLOOD IN STOOL: 0
RHINORRHEA: 0
HEMATURIA: 0
CHILLS: 0

## 2025-06-23 NOTE — PROGRESS NOTES
Rylan Garcia is a 80 y.o. male with Chief Complaint of SNF (Nat) H&P    Resident seen 25 -- MP    CC: LTC (Nat) Recheck    : 1945  SNF H&P done 3/23/23, 25  DC Summary dated 25 reviewed 25  Allergy: Aricept  DNR-CCA    S: 79 yo man with dementia, hypothyroidism, HTN, recent CHI, SHD s/p drain re-admitted to SNF following hospitalization for tachycardia and sepsis d/t pneumonia and newly discovered lytic bony lesions c/w metastatic cancer vs. MM. No CP/SOB. Med List & Problem list reviewed.    O: VSS AFEB 150# (25) Awake, pleasantly confused, NAD. Chest cta. heart tachycardic. Left hemiplegia.    LAB (25) Alb 3.3, Hgb 12.9, Cr 0.5, Na 138, K 3.6    A/P:  # Pneumonia: no antibiotics while on hospice.   # Hx F2F Mobility eval (3/6/25)  # Xerotic eczema: Controlled with HC ointment and prn benadryl.  # Hyperkeratosis: prn lachydrin.  # UE weakness: LTC. completed part B PT.  # Dysphagia: Mech soft/nectar.  # Gait Instability/Falls/CHI/Left hemiplegia d/t SDH: out of soft helmet. Completed SNF PT/OT. Last saw Dr. Ramos (NS) by telehealth 23. CT Head 2023 stable Right SDH.  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  # BPH w LUTS: alfuzosin 10, finasteride 5.  # MDD: tolerated GDR sertraline to 25 mg  # Hypothyroidism: Euthyroid 2024 on 25 mcg LT4  # HTN: stable on alpha blocker. No falls since 2024.  # PACs: NO pAFIB per EP [facility] 10/27/23.  # Left ureteral stone 8x11mm with hydronephrosis per CT 23: patient asymptomatic with stable Creatinine.  # Multiple myeloma: appropriate for hospice. < 6 month prognosis.       Surgical History[1]   Social History     Socioeconomic History    Marital status:      Spouse name: Not on file    Number of children: Not on file    Years of education: Not on file    Highest education level: Not on file   Occupational History    Not on file   Tobacco Use    Smoking status:  Former     Current packs/day: 0.00     Types: Cigarettes     Start date: 1965     Quit date: 1975     Years since quittin.5    Smokeless tobacco: Never   Substance and Sexual Activity    Alcohol use: Not Currently    Drug use: Never    Sexual activity: Not on file   Other Topics Concern    Not on file   Social History Narrative    Not on file     Social Drivers of Health     Financial Resource Strain: Low Risk  (2025)    Overall Financial Resource Strain (CARDIA)     Difficulty of Paying Living Expenses: Not hard at all   Food Insecurity: Patient Unable To Answer (2025)    Hunger Vital Sign     Worried About Running Out of Food in the Last Year: Patient unable to answer     Ran Out of Food in the Last Year: Patient unable to answer   Transportation Needs: No Transportation Needs (2025)    PRAPARE - Transportation     Lack of Transportation (Medical): No     Lack of Transportation (Non-Medical): No   Physical Activity: Not on file   Stress: Not on file   Social Connections: Not on file   Intimate Partner Violence: Patient Unable To Answer (2025)    Humiliation, Afraid, Rape, and Kick questionnaire     Fear of Current or Ex-Partner: Patient unable to answer     Emotionally Abused: Patient unable to answer     Physically Abused: Patient unable to answer     Sexually Abused: Patient unable to answer   Housing Stability: Low Risk  (2025)    Housing Stability Vital Sign     Unable to Pay for Housing in the Last Year: No     Number of Times Moved in the Last Year: 0     Homeless in the Last Year: No     Medical History[2]   Family History[3]     Review of Systems   Constitutional:  Negative for chills, fatigue and fever.   HENT:  Negative for rhinorrhea and sore throat.    Eyes:  Negative for pain and redness.   Respiratory:  Negative for cough and shortness of breath.    Cardiovascular:  Negative for chest pain and palpitations.   Gastrointestinal:  Negative for abdominal pain and  blood in stool.   Endocrine: Negative for polydipsia and polyuria.   Genitourinary:  Negative for dysuria and hematuria.   Musculoskeletal:  Negative for back pain and neck stiffness.   Skin:  Negative for rash and wound.   Allergic/Immunologic: Negative for environmental allergies and food allergies.   Neurological:  Positive for weakness. Negative for headaches.   Hematological:  Negative for adenopathy. Does not bruise/bleed easily.   Psychiatric/Behavioral:  Positive for confusion. Negative for hallucinations and suicidal ideas.       There were no vitals taken for this visit.  Physical Exam  Vitals reviewed.   Constitutional:       General: He is not in acute distress.     Appearance: He is not ill-appearing.   HENT:      Head: Normocephalic and atraumatic.      Right Ear: Tympanic membrane normal.      Left Ear: Tympanic membrane normal.      Nose: No congestion or rhinorrhea.      Mouth/Throat:      Pharynx: No oropharyngeal exudate or posterior oropharyngeal erythema.   Eyes:      Extraocular Movements: Extraocular movements intact.      Conjunctiva/sclera: Conjunctivae normal.      Pupils: Pupils are equal, round, and reactive to light.   Cardiovascular:      Rate and Rhythm: Regular rhythm. Tachycardia present.      Heart sounds: No murmur heard.     No friction rub. No gallop.   Pulmonary:      Effort: Pulmonary effort is normal.      Breath sounds: Normal breath sounds. No wheezing, rhonchi or rales.   Abdominal:      General: There is no distension.      Palpations: Abdomen is soft.      Tenderness: There is no abdominal tenderness. There is no guarding or rebound.   Musculoskeletal:         General: No swelling or deformity.      Cervical back: Normal range of motion and neck supple.      Right lower leg: No edema.      Left lower leg: No edema.   Skin:     Capillary Refill: Capillary refill takes less than 2 seconds.      Coloration: Skin is not jaundiced.      Findings: No rash.   Neurological:       "General: No focal deficit present.      Mental Status: He is alert.      Motor: Weakness present.   Psychiatric:         Mood and Affect: Mood normal.         Behavior: Behavior normal.       Lab Results   Component Value Date    WBC 7.3 06/20/2025    HGB 12.9 (L) 06/20/2025    HCT 38.4 (L) 06/20/2025    MCV 98 06/20/2025     06/20/2025     Lab Results   Component Value Date    CHOL 186 10/08/2021    CHOL 199 07/20/2020    CHOL 221 (H) 01/10/2019     Lab Results   Component Value Date    HDL 39 (L) 10/08/2021    HDL 43 07/20/2020    HDL 44.7 01/10/2019     Lab Results   Component Value Date    LDLCALC 127 10/08/2021    LDLCALC 138 (H) 07/20/2020     Lab Results   Component Value Date    TRIG 99 10/08/2021    TRIG 89 07/20/2020    TRIG 91 01/10/2019     No components found for: \"CHOLHDL\"  Lab Results   Component Value Date    HGBA1C 5.7 05/22/2018       Assessment/Plan   Problem List Items Addressed This Visit       Benign prostatic hyperplasia with lower urinary tract symptoms    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)    Pneumonia due to infectious organism, unspecified laterality, unspecified part of lung - Primary            [1]   Past Surgical History:  Procedure Laterality Date    CATARACT EXTRACTION  09/06/2018    Cataract Extraction    HERNIA REPAIR  09/06/2018    Hernia Repair   [2]   Past Medical History:  Diagnosis Date    Other specified abnormal findings of blood chemistry     Blood cholesterol increased compared with prior measurement    Other specified postprocedural states     Status post hernia repair    Unspecified cataract     Cataracts, bilateral   [3]   Family History  Problem Relation Name Age of Onset    No Known Problems Mother      No Known Problems Father       "

## 2025-06-23 NOTE — LETTER
Patient: Rylan Garcia  : 1945    Encounter Date: 2025    Rylan Garcia is a 80 y.o. male with Chief Complaint of SNF (Nat) H&P    Resident seen 25 -- BRENDA    CC: LTC (Nat) Recheck    : 1945  SNF H&P done 3/23/23, 25  DC Summary dated 25 reviewed 25  Allergy: Aricept  DNR-CCA    S: 79 yo man with dementia, hypothyroidism, HTN, recent CHI, SHD s/p drain re-admitted to SNF following hospitalization for tachycardia and sepsis d/t pneumonia and newly discovered lytic bony lesions c/w metastatic cancer vs. MM. No CP/SOB. Med List & Problem list reviewed.    O: VSS AFEB 150# (25) Awake, pleasantly confused, NAD. Chest cta. heart tachycardic. Left hemiplegia.    LAB (25) Alb 3.3, Hgb 12.9, Cr 0.5, Na 138, K 3.6    A/P:  # Pneumonia: no antibiotics while on hospice.   # Hx F2F Mobility eval (3/6/25)  # Xerotic eczema: Controlled with HC ointment and prn benadryl.  # Hyperkeratosis: prn lachydrin.  # UE weakness: LTC. completed part B PT.  # Dysphagia: Mech soft/nectar.  # Gait Instability/Falls/CHI/Left hemiplegia d/t SDH: out of soft helmet. Completed SNF PT/OT. Last saw Dr. Ramos (NS) by telehealth 23. CT Head 2023 stable Right SDH.  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  # BPH w LUTS: alfuzosin 10, finasteride 5.  # MDD: tolerated GDR sertraline to 25 mg  # Hypothyroidism: Euthyroid 2024 on 25 mcg LT4  # HTN: stable on alpha blocker. No falls since 2024.  # PACs: NO pAFIB per EP [facility] 10/27/23.  # Left ureteral stone 8x11mm with hydronephrosis per CT 23: patient asymptomatic with stable Creatinine.  # Multiple myeloma: appropriate for hospice. < 6 month prognosis.       Surgical History[1]   Social History     Socioeconomic History   • Marital status:      Spouse name: Not on file   • Number of children: Not on file   • Years of education: Not on file   • Highest education level: Not on  file   Occupational History   • Not on file   Tobacco Use   • Smoking status: Former     Current packs/day: 0.00     Types: Cigarettes     Start date: 1965     Quit date: 1975     Years since quittin.5   • Smokeless tobacco: Never   Substance and Sexual Activity   • Alcohol use: Not Currently   • Drug use: Never   • Sexual activity: Not on file   Other Topics Concern   • Not on file   Social History Narrative   • Not on file     Social Drivers of Health     Financial Resource Strain: Low Risk  (2025)    Overall Financial Resource Strain (CARDIA)    • Difficulty of Paying Living Expenses: Not hard at all   Food Insecurity: Patient Unable To Answer (2025)    Hunger Vital Sign    • Worried About Running Out of Food in the Last Year: Patient unable to answer    • Ran Out of Food in the Last Year: Patient unable to answer   Transportation Needs: No Transportation Needs (2025)    PRAPARE - Transportation    • Lack of Transportation (Medical): No    • Lack of Transportation (Non-Medical): No   Physical Activity: Not on file   Stress: Not on file   Social Connections: Not on file   Intimate Partner Violence: Patient Unable To Answer (2025)    Humiliation, Afraid, Rape, and Kick questionnaire    • Fear of Current or Ex-Partner: Patient unable to answer    • Emotionally Abused: Patient unable to answer    • Physically Abused: Patient unable to answer    • Sexually Abused: Patient unable to answer   Housing Stability: Low Risk  (2025)    Housing Stability Vital Sign    • Unable to Pay for Housing in the Last Year: No    • Number of Times Moved in the Last Year: 0    • Homeless in the Last Year: No     Medical History[2]   Family History[3]     Review of Systems   Constitutional:  Negative for chills, fatigue and fever.   HENT:  Negative for rhinorrhea and sore throat.    Eyes:  Negative for pain and redness.   Respiratory:  Negative for cough and shortness of breath.    Cardiovascular:   Negative for chest pain and palpitations.   Gastrointestinal:  Negative for abdominal pain and blood in stool.   Endocrine: Negative for polydipsia and polyuria.   Genitourinary:  Negative for dysuria and hematuria.   Musculoskeletal:  Negative for back pain and neck stiffness.   Skin:  Negative for rash and wound.   Allergic/Immunologic: Negative for environmental allergies and food allergies.   Neurological:  Positive for weakness. Negative for headaches.   Hematological:  Negative for adenopathy. Does not bruise/bleed easily.   Psychiatric/Behavioral:  Positive for confusion. Negative for hallucinations and suicidal ideas.       There were no vitals taken for this visit.  Physical Exam  Vitals reviewed.   Constitutional:       General: He is not in acute distress.     Appearance: He is not ill-appearing.   HENT:      Head: Normocephalic and atraumatic.      Right Ear: Tympanic membrane normal.      Left Ear: Tympanic membrane normal.      Nose: No congestion or rhinorrhea.      Mouth/Throat:      Pharynx: No oropharyngeal exudate or posterior oropharyngeal erythema.   Eyes:      Extraocular Movements: Extraocular movements intact.      Conjunctiva/sclera: Conjunctivae normal.      Pupils: Pupils are equal, round, and reactive to light.   Cardiovascular:      Rate and Rhythm: Regular rhythm. Tachycardia present.      Heart sounds: No murmur heard.     No friction rub. No gallop.   Pulmonary:      Effort: Pulmonary effort is normal.      Breath sounds: Normal breath sounds. No wheezing, rhonchi or rales.   Abdominal:      General: There is no distension.      Palpations: Abdomen is soft.      Tenderness: There is no abdominal tenderness. There is no guarding or rebound.   Musculoskeletal:         General: No swelling or deformity.      Cervical back: Normal range of motion and neck supple.      Right lower leg: No edema.      Left lower leg: No edema.   Skin:     Capillary Refill: Capillary refill takes less than 2  "seconds.      Coloration: Skin is not jaundiced.      Findings: No rash.   Neurological:      General: No focal deficit present.      Mental Status: He is alert.      Motor: Weakness present.   Psychiatric:         Mood and Affect: Mood normal.         Behavior: Behavior normal.       Lab Results   Component Value Date    WBC 7.3 06/20/2025    HGB 12.9 (L) 06/20/2025    HCT 38.4 (L) 06/20/2025    MCV 98 06/20/2025     06/20/2025     Lab Results   Component Value Date    CHOL 186 10/08/2021    CHOL 199 07/20/2020    CHOL 221 (H) 01/10/2019     Lab Results   Component Value Date    HDL 39 (L) 10/08/2021    HDL 43 07/20/2020    HDL 44.7 01/10/2019     Lab Results   Component Value Date    LDLCALC 127 10/08/2021    LDLCALC 138 (H) 07/20/2020     Lab Results   Component Value Date    TRIG 99 10/08/2021    TRIG 89 07/20/2020    TRIG 91 01/10/2019     No components found for: \"CHOLHDL\"  Lab Results   Component Value Date    HGBA1C 5.7 05/22/2018       Assessment/Plan  Problem List Items Addressed This Visit       Benign prostatic hyperplasia with lower urinary tract symptoms    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)    Pneumonia due to infectious organism, unspecified laterality, unspecified part of lung - Primary         Electronically Signed By: David Hong MD   6/23/25  5:34 PM       [1]  Past Surgical History:  Procedure Laterality Date   • CATARACT EXTRACTION  09/06/2018    Cataract Extraction   • HERNIA REPAIR  09/06/2018    Hernia Repair   [2]  Past Medical History:  Diagnosis Date   • Other specified abnormal findings of blood chemistry     Blood cholesterol increased compared with prior measurement   • Other specified postprocedural states     Status post hernia repair   • Unspecified cataract     Cataracts, bilateral   [3]  Family History  Problem Relation Name Age of Onset   • No Known Problems Mother     • No Known Problems Father     "

## 2025-06-25 ENCOUNTER — NURSING HOME VISIT (OUTPATIENT)
Dept: POST ACUTE CARE | Facility: EXTERNAL LOCATION | Age: 80
End: 2025-06-25
Payer: MEDICARE

## 2025-06-25 DIAGNOSIS — K59.00 CONSTIPATION, UNSPECIFIED CONSTIPATION TYPE: ICD-10-CM

## 2025-06-25 DIAGNOSIS — E78.5 HYPERLIPIDEMIA, UNSPECIFIED HYPERLIPIDEMIA TYPE: ICD-10-CM

## 2025-06-25 DIAGNOSIS — I48.91 ATRIAL FIBRILLATION, UNSPECIFIED TYPE (MULTI): ICD-10-CM

## 2025-06-25 DIAGNOSIS — M89.8X9 LYTIC BONE LESIONS ON XRAY: ICD-10-CM

## 2025-06-25 DIAGNOSIS — M62.81 MUSCLE WEAKNESS (GENERALIZED): Primary | ICD-10-CM

## 2025-06-25 DIAGNOSIS — I10 PRIMARY HYPERTENSION: ICD-10-CM

## 2025-06-25 DIAGNOSIS — M19.90 OSTEOARTHRITIS, UNSPECIFIED OSTEOARTHRITIS TYPE, UNSPECIFIED SITE: ICD-10-CM

## 2025-06-25 DIAGNOSIS — F03.90 DEMENTIA, UNSPECIFIED DEMENTIA SEVERITY, UNSPECIFIED DEMENTIA TYPE, UNSPECIFIED WHETHER BEHAVIORAL, PSYCHOTIC, OR MOOD DISTURBANCE OR ANXIETY (MULTI): ICD-10-CM

## 2025-06-25 DIAGNOSIS — N40.1 BENIGN PROSTATIC HYPERPLASIA WITH LOWER URINARY TRACT SYMPTOMS, SYMPTOM DETAILS UNSPECIFIED: ICD-10-CM

## 2025-06-25 DIAGNOSIS — S06.5XAA SDH (SUBDURAL HEMATOMA) (MULTI): ICD-10-CM

## 2025-06-25 DIAGNOSIS — G47.00 INSOMNIA, UNSPECIFIED TYPE: ICD-10-CM

## 2025-06-25 DIAGNOSIS — F32.A DEPRESSION, UNSPECIFIED DEPRESSION TYPE: ICD-10-CM

## 2025-06-25 DIAGNOSIS — E03.9 HYPOTHYROIDISM, UNSPECIFIED TYPE: ICD-10-CM

## 2025-06-25 PROCEDURE — 99310 SBSQ NF CARE HIGH MDM 45: CPT | Performed by: NURSE PRACTITIONER

## 2025-07-06 NOTE — PROGRESS NOTES
*Provider Impression*    Patient is a 80 year old male who is seen today for management of multiple medical problems  #Weakness / Lytic lesions - acetaminophen 650mg TID and q4h PRN, morphine per hospice  #SDH / OA - s/p R craniotomy; helmet,  glucosamine chondroitin MSM daily, lidocaine 4% patch to R shoulder, biofreeze 4% BID,   #BPH - doxazosin 2mg, alfluzosin ER 10mg daily, finasteride 5mg daily, check UA c&s  #HTN / HLD / A-fib - CoQ10 100mg daily, omega 3 650mg QOD  #Hypothyroidism - levothyroxine 25mcg daily  #Constipation - colace 100mg daily, senna 17.2mg daily PRN  #Dermatitis - nystatin triamcinolone cream BID, hydrocortisone 2.5% q8h PRN, ketoconazole 2% shampoo weekly  #Depression / Insomnia / Dementia - mgmt per psych - sertraline, hydroxyzine, melatonin, oxcarbazepine, lorazepam  #Vitamin deficiency - vitamin B12 2500mcg daily, multivitamin daily  #ACP - DNRCC - consult hospice  Follow up as needed      *Chief Complaint*     weakness    *History of Present Illness*    Patient is an 79 y/o male LTC resident of [facility] as below who is seen today for f/u and management of multiple medical problems.    He was sent to [facility] on 6/17 from nursing facility for tachycardia and altered mental status. Over the past few days they have noted some behavioral changes stating that he often is closing his eyes, not smiling, eating less of his food than normal.  They then noticed that he has been becoming diaphoretic and noted to have tachycardia in the 120s.  ER workup was significant for tachycardia. On CT imaging, there was a small area of consolidation and adjacent pleural effusion right lower lobe likely representing pneumonia. There was also numerous lytic lesions within the axial skeleton, many which are larger compared to 06/13/2023 consistent with multiple myeloma. These are seen in the posterior right 5th rib, T5 and T11 vertebral bodies, L3 spinous process, and left annmarie sacrum at S1.  He was  treated with antibiotics for his pneumonia and started on metoprolol to rate control his tachycardia. After further discussion with family, the patient was transitioned to hospice care. Then d/c back to [facility] on 6/25.    He is seen today sitting up in his room today visiting with his wife. Limited history but appears in NAD.    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, dementia, hypothyroidism, frequent falls, SDH  PSH - hernia repair, cataract extraction  FH - Father had heart disease, dementia, mother had HTN  SocHx -  former smoker, No EtOH    *Review of Systems*  All other systems reviewed are negative except as noted in the HPI     *Vital Siins*   Date: 6/20/25  - T: 97.2  P: 139  R: 22  BP: 93/60  SpO2: 93% on RA ; Date: 6/5/25  Wt: 149.6    *Results / Data*  CBC - Date: 6/20/25  WBC: 7.3  HGB: 12.9  HCT: 38.4  PLT: 246  ;   BMP - Date: 6/20/25  Na: 138  K: 3.6  Cl: 106  CO2: 26  BUN: 10  Cr: 0.85  Glu: 100  Ca: 8.4  Phos: 3.7  Alb: 3.4  ;   LFT - Date: 6/17/25  AST: 18  ALT: 33  ALP: 101  Tbili: 0.4  ;   Coags - Date:   INR:   PT:  Other - Date: 3/29/23 - TSH: 1.100  T4: 5.8 ; 5/15/23 - TSH: 1.530; 5/8/24  TSH: 1.960; 1/24/25  TSH: 2.180    *Physical Exam*  Gen: (+) NAD, (+) well-appearing  HEENT: (+) normocephalic, (+) MMM  Neck: (+) supple  Lungs: (+) CTAB, (-) wheezes, (-) rales, (-) rhonchi  Heart: (+) irreg irreg, (+) S1 S2  Pulses: (+) palpable  Abd: (+) soft, (+) NT, (+) ND, (+) BS+  Ext: (-) edema, (-) deformity  MSK: (-) joint swelling  Skin: (+) warm, (+) dry, (-) rash  Neuro: (+) follows commands, (-) tremor, (+) alert

## 2025-07-07 ENCOUNTER — NURSING HOME VISIT (OUTPATIENT)
Dept: POST ACUTE CARE | Facility: EXTERNAL LOCATION | Age: 80
End: 2025-07-07
Payer: MEDICARE

## 2025-07-07 DIAGNOSIS — R13.10 DYSPHAGIA, UNSPECIFIED TYPE: Primary | ICD-10-CM

## 2025-07-07 DIAGNOSIS — M19.90 OSTEOARTHRITIS, UNSPECIFIED OSTEOARTHRITIS TYPE, UNSPECIFIED SITE: ICD-10-CM

## 2025-07-07 PROCEDURE — 99308 SBSQ NF CARE LOW MDM 20: CPT | Performed by: FAMILY MEDICINE

## 2025-07-07 NOTE — LETTER
Patient: Rylan Garcia  : 1945    Encounter Date: 2025    Resident seen 25 -- MP    CC: LTC (Nat) Recheck    : 1945  SNF H&P done 3/23/23, 25  DC Summary dated 25 reviewed 25  Allergy: Aricept  DNR-CCA    S: 79 yo man with dementia, hypothyroidism, HTN, and CHI with recent sepsis d/t pneumonia and newly discovered lytic bony lesions c/w metastatic cancer vs. MM. No CP/SOB. Med List & Problem list reviewed.    O: VSS AFEB 150# (25) Awake, pleasantly confused, NAD. Chest cta. heart tachycardic. Left hemiplegia.    LAB (25) Alb 3.3, Hgb 12.9, Cr 0.5, Na 138, K 3.6    A/P:  # Xerotic eczema: Controlled with HC ointment and prn benadryl.  # Hyperkeratosis: prn lachydrin.  # UE weakness: LTC. completed part B PT.  # Dysphagia: [facility] soft/nectar.  # Gait Instability/Falls/CHI/Left hemiplegia d/t SDH: out of soft helmet. Completed SNF PT/OT. Last saw Dr. Ramos (NS) by telehealth 23. CT Head 2023 stable Right SDH.  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  # BPH w LUTS: alfuzosin 10, finasteride 5.  # MDD: tolerated GDR sertraline to 25 mg  # Hypothyroidism: Euthyroid 2024 on 25 mcg LT4  # HTN: stable on alpha blocker. No falls since 2024.  # PACs: NO pAFIB per EP [facility] 10/27/23.  # Left ureteral stone 8x11mm with hydronephrosis per CT 23: patient asymptomatic with stable Creatinine.  # Multiple myeloma: appropriate for hospice. < 6 month prognosis.  # Hx Pneumonia: off antibiotics while on hospice.  # Hx F2F Mobility eval (3/6/25)    Electronically Signed By: David Hong MD   25  3:19 PM

## 2025-07-13 NOTE — PROGRESS NOTES
Resident seen 25 -- MP    CC: LTC (Nat) Recheck    : 1945  SNF H&P done 3/23/23, 25  DC Summary dated 25 reviewed 25  Allergy: Aricept  DNR-CCA    S: 81 yo man with dementia, hypothyroidism, HTN, and CHI with recent sepsis d/t pneumonia and newly discovered lytic bony lesions c/w metastatic cancer vs. MM. No CP/SOB. Med List & Problem list reviewed.    O: VSS AFEB 150# (25) Awake, pleasantly confused, NAD. Chest cta. heart tachycardic. Left hemiplegia.    LAB (25) Alb 3.3, Hgb 12.9, Cr 0.5, Na 138, K 3.6    A/P:  # Xerotic eczema: Controlled with HC ointment and prn benadryl.  # Hyperkeratosis: prn lachydrin.  # UE weakness: LTC. completed part B PT.  # [facility]  # Gait Instability/Falls/CHI/Left hemiplegia d/t SDH: out of soft helmet. Completed SNF PT/OT. Last saw Dr. Ramos (NS) by telehealth 23. CT Head 2023 stable Right SDH.  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  # BPH w LUTS: alfuzosin 10, finasteride 5.  # MDD: tolerated GDR sertraline to 25 mg  # Hypothyroidism: Euthyroid 2024 on 25 mcg LT4  # HTN: stable on alpha blocker. No falls since 2024.  # PACs: NO pAFIB per EP [facility] 10/27/23.  # Left ureteral stone 8x11mm with hydronephrosis per CT 23: patient asymptomatic with stable Creatinine.  # Multiple myeloma: appropriate for hospice. < 6 month prognosis.  # Hx Pneumonia: off antibiotics while on hospice.  # Hx F2F Mobility eval (3/6/25)

## 2025-07-18 LAB
Q ONSET: 225 MS
QRS COUNT: 23 BEATS
QRS DURATION: 80 MS
QT INTERVAL: 316 MS
QTC CALCULATION(BAZETT): 482 MS
QTC FREDERICIA: 419 MS
R AXIS: 10 DEGREES
T AXIS: 13 DEGREES
T OFFSET: 383 MS
VENTRICULAR RATE: 140 BPM